# Patient Record
Sex: FEMALE | Race: WHITE | Employment: FULL TIME | URBAN - METROPOLITAN AREA
[De-identification: names, ages, dates, MRNs, and addresses within clinical notes are randomized per-mention and may not be internally consistent; named-entity substitution may affect disease eponyms.]

---

## 2020-03-09 ENCOUNTER — OFFICE VISIT (OUTPATIENT)
Dept: FAMILY MEDICINE CLINIC | Age: 60
End: 2020-03-09
Payer: COMMERCIAL

## 2020-03-09 VITALS
WEIGHT: 166.2 LBS | RESPIRATION RATE: 16 BRPM | HEIGHT: 70 IN | BODY MASS INDEX: 23.79 KG/M2 | DIASTOLIC BLOOD PRESSURE: 78 MMHG | OXYGEN SATURATION: 98 % | SYSTOLIC BLOOD PRESSURE: 120 MMHG | HEART RATE: 77 BPM

## 2020-03-09 PROCEDURE — 99203 OFFICE O/P NEW LOW 30 MIN: CPT | Performed by: FAMILY MEDICINE

## 2020-03-09 PROCEDURE — 99386 PREV VISIT NEW AGE 40-64: CPT | Performed by: FAMILY MEDICINE

## 2020-03-09 ASSESSMENT — PATIENT HEALTH QUESTIONNAIRE - PHQ9
1. LITTLE INTEREST OR PLEASURE IN DOING THINGS: 0
2. FEELING DOWN, DEPRESSED OR HOPELESS: 0
SUM OF ALL RESPONSES TO PHQ QUESTIONS 1-9: 0
SUM OF ALL RESPONSES TO PHQ9 QUESTIONS 1 & 2: 0
SUM OF ALL RESPONSES TO PHQ QUESTIONS 1-9: 0

## 2020-03-09 ASSESSMENT — ENCOUNTER SYMPTOMS
ABDOMINAL PAIN: 0
VOMITING: 0
SHORTNESS OF BREATH: 0
DIARRHEA: 0
NAUSEA: 0
CONSTIPATION: 0

## 2020-03-09 NOTE — PROGRESS NOTES
None   Lifestyle    Physical activity     Days per week: None     Minutes per session: None    Stress: None   Relationships    Social connections     Talks on phone: None     Gets together: None     Attends [employer] service: None     Active member of club or organization: None     Attends meetings of clubs or organizations: None     Relationship status: None    Intimate partner violence     Fear of current or ex partner: None     Emotionally abused: None     Physically abused: None     Forced sexual activity: None   Other Topics Concern    None   Social History Narrative    None     Family History   Problem Relation Age of Onset    High Blood Pressure Mother     Cancer Father         leukemia at 39    Cancer Brother         bladder cancer at 62                              Review Of Systems    Review of Systems   Constitutional: Negative for chills and fever. Eyes: Negative for visual disturbance. Respiratory: Negative for shortness of breath. Cardiovascular: Negative for chest pain. Gastrointestinal: Negative for abdominal pain, constipation, diarrhea, nausea and vomiting. Genitourinary: Negative for dysuria. Psychiatric/Behavioral: Negative for behavioral problems. PHYSICAL EXAMINATION:    /78   Pulse 77   Resp 16   Ht 5' 10\" (1.778 m)   Wt 166 lb 3.2 oz (75.4 kg)   SpO2 98%   BMI 23.85 kg/m²      Physical Exam  Vitals signs reviewed. Constitutional:       General: She is not in acute distress. Appearance: She is well-developed. She is not diaphoretic. HENT:      Head: Normocephalic and atraumatic. Right Ear: External ear normal.      Left Ear: External ear normal.      Mouth/Throat:      Pharynx: No oropharyngeal exudate. Eyes:      General:         Right eye: No discharge. Left eye: No discharge. Conjunctiva/sclera: Conjunctivae normal.      Pupils: Pupils are equal, round, and reactive to light.    Cardiovascular:      Rate and Rhythm: Normal rate and regular rhythm. Heart sounds: Normal heart sounds. Pulmonary:      Effort: Pulmonary effort is normal. No respiratory distress. Breath sounds: Normal breath sounds. No wheezing. Abdominal:      General: Bowel sounds are normal. There is no distension. Palpations: Abdomen is soft. Tenderness: There is no abdominal tenderness. Comments: Lipoma mid abdomen. Skin:     General: Skin is warm and dry. Neurological:      Mental Status: She is alert and oriented to person, place, and time. ASSESSMENT:   Well Adult, See encounter diagnoses  Adilene Mahajan was seen today for new patient. Diagnoses and all orders for this visit:    Annual physical exam  -     Lipid Panel  -     Comprehensive Metabolic Panel    History of uterine cancer  -     Silvana - Corinne Dixon DO, Obstetrics, [facility]    Breast cancer screening  -     ANIL SCREENING W CAD BILATERAL 2 VW; Future    Lipoma of torso  -     Isis Orozco MD, General Surgery, [facility]    Need for hepatitis C screening test  -     Hepatitis C Antibody    Immunity status testing  -     RUBEOLA ANTIBODY IGG          Plan:   See orders and medications filed with this encounter. The patient is advised to return for routine annual checkups. Encouraged healthy diet, regular exercise and multivitamin daily. Labs checked per orders. Optho visit q 1-2 years. Watch for skin mole changes. Colonoscopy if over age 48 or if family history was discussed. Vaccines ordered today per orders.     Return in about 1 year (around 3/9/2021) for physical exam.

## 2020-03-10 ENCOUNTER — TELEPHONE (OUTPATIENT)
Dept: FAMILY MEDICINE CLINIC | Age: 60
End: 2020-03-10

## 2020-03-10 LAB
A/G RATIO: 1.5 (ref 1.1–2.2)
ALBUMIN SERPL-MCNC: 4.5 G/DL (ref 3.4–5)
ALP BLD-CCNC: 68 U/L (ref 40–129)
ALT SERPL-CCNC: 22 U/L (ref 10–40)
ANION GAP SERPL CALCULATED.3IONS-SCNC: 14 MMOL/L (ref 3–16)
AST SERPL-CCNC: 21 U/L (ref 15–37)
BILIRUB SERPL-MCNC: 0.3 MG/DL (ref 0–1)
BUN BLDV-MCNC: 17 MG/DL (ref 7–20)
CALCIUM SERPL-MCNC: 9.5 MG/DL (ref 8.3–10.6)
CHLORIDE BLD-SCNC: 100 MMOL/L (ref 99–110)
CHOLESTEROL, TOTAL: 224 MG/DL (ref 0–199)
CO2: 26 MMOL/L (ref 21–32)
CREAT SERPL-MCNC: 0.7 MG/DL (ref 0.6–1.1)
GFR AFRICAN AMERICAN: >60
GFR NON-AFRICAN AMERICAN: >60
GLOBULIN: 3 G/DL
GLUCOSE BLD-MCNC: 93 MG/DL (ref 70–99)
HDLC SERPL-MCNC: 69 MG/DL (ref 40–60)
HEPATITIS C ANTIBODY INTERPRETATION: NORMAL
LDL CHOLESTEROL CALCULATED: 135 MG/DL
MEASLES IMMUNE (IGG): NORMAL
POTASSIUM SERPL-SCNC: 4.5 MMOL/L (ref 3.5–5.1)
REASON FOR REJECTION: NORMAL
REJECTED TEST: NORMAL
SODIUM BLD-SCNC: 140 MMOL/L (ref 136–145)
TOTAL PROTEIN: 7.5 G/DL (ref 6.4–8.2)
TRIGL SERPL-MCNC: 98 MG/DL (ref 0–150)
VLDLC SERPL CALC-MCNC: 20 MG/DL

## 2020-05-14 ENCOUNTER — TELEPHONE (OUTPATIENT)
Dept: SURGERY | Age: 60
End: 2020-05-14

## 2020-05-19 ENCOUNTER — OFFICE VISIT (OUTPATIENT)
Dept: OBGYN CLINIC | Age: 60
End: 2020-05-19
Payer: COMMERCIAL

## 2020-05-19 VITALS
TEMPERATURE: 98.1 F | BODY MASS INDEX: 23.88 KG/M2 | HEART RATE: 97 BPM | DIASTOLIC BLOOD PRESSURE: 74 MMHG | WEIGHT: 166.8 LBS | HEIGHT: 70 IN | SYSTOLIC BLOOD PRESSURE: 118 MMHG

## 2020-05-19 PROBLEM — Z85.42 HISTORY OF UTERINE CANCER: Status: ACTIVE | Noted: 2020-05-19

## 2020-05-19 PROBLEM — N39.3 SUI (STRESS URINARY INCONTINENCE, FEMALE): Status: ACTIVE | Noted: 2020-05-19

## 2020-05-19 PROBLEM — R61 NIGHT SWEATS: Status: ACTIVE | Noted: 2020-05-19

## 2020-05-19 PROCEDURE — 99386 PREV VISIT NEW AGE 40-64: CPT | Performed by: OBSTETRICS & GYNECOLOGY

## 2020-05-19 RX ORDER — PAROXETINE HYDROCHLORIDE 12.5 MG/1
12.5 TABLET, FILM COATED, EXTENDED RELEASE ORAL DAILY
Qty: 30 TABLET | Refills: 3 | Status: SHIPPED | OUTPATIENT
Start: 2020-05-19 | End: 2020-05-20

## 2020-05-19 ASSESSMENT — ENCOUNTER SYMPTOMS
VOMITING: 0
SHORTNESS OF BREATH: 0
ABDOMINAL PAIN: 0
COUGH: 0
DIARRHEA: 0
NAUSEA: 0
CONSTIPATION: 0
SORE THROAT: 0

## 2020-05-19 NOTE — PROGRESS NOTES
Positive for sleep disturbance (difficulty staying asleep). Primary Care Physician: Onel Silva MD    Obstetric History  OB History    Para Term  AB Living   3 3           SAB TAB Ectopic Molar Multiple Live Births                    # Outcome Date GA Lbr Wojciech/2nd Weight Sex Delivery Anes PTL Lv   3 Para 89   9 lb (4.082 kg) M Vag-Spont      2 Para 87   10 lb (4.536 kg) M Vag-Spont      1 Para 85   9 lb (4.082 kg) M Vag-Spont          Gynecologic History  Menstrual History:   LMP: s/p Hysterectomy in    Age of Menarche: 17   Hx of Stage 1 endometrial cancer    Sexual History:   Contraception: see above   Currently is sexually active   1 Lifetime partners   Denies history of STIs   Denies sexual problems    Pap History:   History of abnormal pap smears: see above   Last pap: see above      Medical History:  Past Medical History:   Diagnosis Date    Menopausal symptoms     Uterine cancer ([facility] [address] 2011    at 50 stage 1       Medications:  Current Outpatient Medications   Medication Sig Dispense Refill    PARoxetine (PAXIL CR) 12.5 MG extended release tablet Take 1 tablet by mouth daily 30 tablet 3     No current facility-administered medications for this visit. Surgical History:  Past Surgical History:   Procedure Laterality Date    COLONOSCOPY      COLONOSCOPY  3/9/16    poor prep;no polyps    HYSTERECTOMY  2011    HYSTERECTOMY, TOTAL ABDOMINAL      LIPOMA RESECTION      abd.    TUBAL LIGATION         Allergies:  No Known Allergies    Family History:  Family History   Problem Relation Age of Onset    High Blood Pressure Mother     Cancer Father         leukemia at 43    Cancer Brother         bladder cancer at 62      Reports personal/family history of cervical, uterine, ovarian, vulvar, breast, or colon cancers.      - Personal hx of uterine cancer  Denies personal/family history of bleeding or clotting disorders  Denies personal/family of motion and neck supple. Thyroid: No thyromegaly. Cardiovascular:      Rate and Rhythm: Normal rate. Pulses: Normal pulses. Pulmonary:      Effort: Pulmonary effort is normal. No respiratory distress. Chest:      Breasts:         Right: No mass, nipple discharge, skin change or tenderness. Left: No mass, nipple discharge, skin change or tenderness. Abdominal:      General: There is no distension. Palpations: Abdomen is soft. There is no mass. Tenderness: There is no abdominal tenderness. There is no guarding or rebound. Genitourinary:     General: Normal vulva. Exam position: Lithotomy position. Labia:         Right: No rash, tenderness or lesion. Left: No rash, tenderness or lesion. Vagina: No vaginal discharge, erythema or tenderness. Adnexa:         Right: No mass, tenderness or fullness. Left: No mass, tenderness or fullness. Comments: Uterus, cervix and bilateral ovaries surgically absent. No masses or lesions on vaginal cuff. Musculoskeletal:         General: No swelling. Skin:     General: Skin is warm and dry. Neurological:      Mental Status: She is alert and oriented to person, place, and time. Psychiatric:         Mood and Affect: Mood normal.         Behavior: Behavior normal.         Thought Content: Thought content normal.       Assessment/Plan:  64 y.o.  presenting for her annual exam:    1. Encntr for gyn exam (general) (routine) w/o abn findings     - Reviewed indications for pap smear following negative cervical pathology on hysterectomy specimen - no pap smear collected today     - Age based screening recommendations discussed     - Self breast exams/awareness discussed with the patient     - Healthy lifestyle habits discussed including calcium and vitamin D supplementation     - Will follow-up in 1 year for annual exam     2.  Night sweats     - Risks, benefits and alternatives were reviewed with the

## 2020-05-19 NOTE — PROGRESS NOTES
Last PAP was normal; 1 or 2 years ago   Abnormal pap history? no  Last HPV screen: unknown    {MAMMOGRAM HISTORY patient had mammogram today  Last Dexa Scan: N/A   Contraceptive method: Status post hysterectomy  Last colonoscopy was 2016

## 2020-05-20 ENCOUNTER — TELEPHONE (OUTPATIENT)
Dept: OBGYN CLINIC | Age: 60
End: 2020-05-20

## 2020-05-20 RX ORDER — PAROXETINE 10 MG/1
10 TABLET, FILM COATED ORAL EVERY MORNING
Qty: 30 TABLET | Refills: 3 | Status: SHIPPED | OUTPATIENT
Start: 2020-05-20 | End: 2021-04-01

## 2020-05-20 NOTE — TELEPHONE ENCOUNTER
Received fax from [facility], Paroxetine 12.5 mg not covered under patient plan. Spoke to pharmacist, states due to increased cost of the 12.5 mg dose her insurance does not cover. States they do cover the 10 or 40 mg dose. Do you want to change Rx?  Prompted order, please review and send if appropriate

## 2020-06-04 NOTE — TELEPHONE ENCOUNTER
Pt calling to check status of Paxil medication order as she was told by pharmacy it would not be covered. Verified with pharmacy that medication with dose change was approved and ready for . LM for pt to let her medication is now available for .   DONE

## 2020-06-30 ENCOUNTER — HOSPITAL ENCOUNTER (OUTPATIENT)
Dept: PHYSICAL THERAPY | Age: 60
Setting detail: THERAPIES SERIES
Discharge: HOME | End: 2020-06-30
Payer: COMMERCIAL

## 2020-06-30 PROCEDURE — 97530 THERAPEUTIC ACTIVITIES: CPT

## 2020-06-30 PROCEDURE — 97163 PT EVAL HIGH COMPLEX 45 MIN: CPT

## 2020-06-30 NOTE — PLAN OF CARE
Physical Therapy Pelvic Floor Evaluation    2020  Patient: Agustina Aiken : 1960  MRN: [id number]    This is a 64 y.o. female [facility]quez[facility] with a primary diagnosis of: KOURTNEY    Onset Date:   Next MD appt: prn    Subjective:   Patient history: Patient reports \"started noticing symptoms of UI in my 40's\". Reports \"I first noticed it when jumping around, sneezing\". \"I think it is better when I exercise\". Reports \"I notice things are worse when I don't exercise\". Denies pain with intercourse, denies bowel symptoms. Reports she had a complete hysterectomy at age 47. Denies history of sexual abuse/trauma. 2nd person present during evaluation today? Declined  What do you feel is your problem? \"incontinence\"   When did you problem first start? \"in my 40's is when I really noticed\". Has your problem been getting worse, stay the same, or getting better? \"getting a little worse\"  Have you ever had treatment for this problem? NO    4 week or greater of failed trial of PFPT program? NO   PFPT program as defined by \"Completing 4 weeks of an ordered plan of pelvic muscle exercises designed to increase periurethral muscle strength\". Urinary frequency? Daytime? YES Nighttime? YES  Bowel problems? denies  Urinary or bowel leakage? urinary  Leakage frequency? 3-4x/day  Protection: none      Pregnancy:   # of pregnancies: 3   # of deliveries: 3 vaginal   Episiotomy: yes   Normal post-cris healing? yes  Are you having regular menstrual cycles? No, hysterectomy at age 47  Date of last pap smear? May 2020    Pain: 0/10    Sensation/neurovascular: denies N/T  Diagnostic tests: none done to date.      Precautions/Contraindications: universal       Past Medical History:   Diagnosis Date    Menopausal symptoms     Uterine cancer ([facility] [address] 2011    at 50 stage 1     Past Surgical History:   Procedure Laterality Date    COLONOSCOPY      COLONOSCOPY  3/9/16    poor prep;no polyps    HYSTERECTOMY  9/2011    HYSTERECTOMY, TOTAL ABDOMINAL      LIPOMA RESECTION      abd.    TUBAL LIGATION       Not in a hospital admission. Current Outpatient Medications:     PARoxetine (PAXIL) 10 MG tablet, Take 1 tablet by mouth every morning, Disp: 30 tablet, Rfl: 3  No Known Allergies      Objective:    PFDI score:102.1/300    Observation:   Posture: WFL   Gait analysis: WFL  Lumbar Mobility: WFL  Scar Location/Mobility: n/a  Diastasis Recti (in finger width): n/a    Special Tests:  Sacroiliac Test:   Gaenslen: negative    ELVIS: positive     Lumbar Spine:   Slump test: negative       Neuro:   Sensation: (B) UEs: intact to light touch   Sensation: (B) LEs: intact to light touch   Anal Sensation:    S5 intact to light touch    S4 intact to light touch    S3 intact to light touch   Reflexes:     Anal: present    Cough: present    Pelvic Floor Exam  External:  Skin Condition: normal  Sensation: intact  Palpation: no point tenderness noted  Tone: hypotonic  Perineal Body Mobility:    Voluntary PFM Contraction: present (normal)   Voluntary PFM Relaxation: present (normal)   Involuntary PFM Contraction/Cough Reflex: present (normal)   Involuntary PFM Relaxation: present (normal)  Perineal Body Descent:   Rest: flattened   Bearing down: absent (normal-cephalad)    Q-tip test: negative       Internal:  Sensation: intact  Palpation: no point tenderness noted   Vaginal Vault Size: WNL  PERFECT:   Power: 3+/5   Endurance: 3sec. Repetitions: 10   Fast Twitch: 10   Elevation: present   Co-contraction: present   Timing: present  OI strength: 4/5 B  Pelvic Organ Prolapse: none noted       Treatment: see flowsheet    Assessment:  Impression: Patient is a 63yo female referred to PT due to urinary incontinence. The patient demonstrated decreased pelvic floor strength and endurance. The patient is reporting stress incontinence, frequent urination, and some bowel discomfort.   The patient seems very motivated and should improve with PT and HEP compliance. Time Frame: 4-6 weeks. Rehab Potential: good    Goals:  1. Patient will demonstrate independence with HEP to self-manage symptoms. 2. Patient will demonstrate pelvic floor strength of at least 4/5 to improve functioning. 3. Patient will demonstrate pelvic floor endurance of at least 8 seconds indicating improved endurance. 4. Patient will improve PFDI score by at least 25 points demonstrating improved pelvic floor functioning and quality of life. Plan  Patient will be seen 1-2x/week for 4-6weeks. Rx to consist of: Home management, NMred, manual, modalities PRN, TA, TE    Time IN/OUT: 11:15am-12:11pm      Letty Taylor PT, DPT    ______________________________________________________________________    If you have any questions or concerns, please don't hesitate to call.   Thank you for your referral.    Physician Signature:________________________________Date:__________________  By signing above, therapists plan is approved by physician

## 2020-06-30 NOTE — FLOWSHEET NOTE
Physical Therapy Daily Treatment Note    Date:  2020    Patient Name:  Mikc Montero    :  1960  MRN: [id number]  Restrictions/Precautions:  Universal   Medical/Treatment Diagnosis Information:  · Diagnosis: KOURTNEY  Insurance/Certification information:  PT Insurance Information: [employer]  Physician Information:  Referring Practitioner: Corrinne Gerlach DO  Plan of care signed (Y/N):  N  Visit# / total visits:       G-Code (if applicable):          PFDI: 102.1/300    Medicare Cap (if applicable):  n/a = total amount used, updated 2020    Time in:   11:15am      Timed Treatment: 15min. Total Treatment Time:  56min.  ________________________________________________________________________________________    Pain Level:    /10  SUBJECTIVE:  See eval    OBJECTIVE:     Exercise (TE) Resistance/Repetitions Other comments            PF strengthening                                PF relaxation                                   Other Treatment:   TA:  Bladder re-training/education: 15min    Bladder Diary: patient educated on importance of filling out bladder diary at home, complete with fluid intake, voids, and leakage when applicable. Voiding: patient educated on normal voiding and urinary cycle and the physiology of bladder control muscles and pelvic floor. The patient was educated on bladder dysfunction as well as KOURTNEY with urethral involvement. The patient was also educated on prolapse and it's affect on urination and pain. Dietary: patient educated on the \"4Cs\" to reduce bladder irritation and leakage. Other:    Manual Treatments:   --      Modalities:  --    Test/Measurements:  See eval           ASSESSMENT:  See eval       Treatment/Activity Tolerance:   [x] Patient tolerated treatment well [] Patient limited by fatique  [] Patient limited by pain [] Patient limited by other medical complications  [] Other:     Goals:    Time Frame: 4-6 weeks. Rehab Potential: good     Goals:  1. Patient will demonstrate independence with HEP to self-manage symptoms. 2. Patient will demonstrate pelvic floor strength of at least 4/5 to improve functioning. 3. Patient will demonstrate pelvic floor endurance of at least 8 seconds indicating improved endurance.   4. Patient will improve PFDI score by at least 25 points demonstrating improved pelvic floor functioning and quality of life        Plan: [x] Continue per plan of care [] Alter current plan (see comments)   [] Plan of care initiated [] Hold pending MD visit [] Discharge      Plan for Next Session:  Review diary    Re-Certification Due Date: July 30,2020        Signature:  Monika Win, PT, DPT

## 2020-07-02 ENCOUNTER — APPOINTMENT (OUTPATIENT)
Dept: PHYSICAL THERAPY | Age: 60
End: 2020-07-02
Payer: COMMERCIAL

## 2020-07-07 ENCOUNTER — HOSPITAL ENCOUNTER (OUTPATIENT)
Dept: PHYSICAL THERAPY | Age: 60
Setting detail: THERAPIES SERIES
Discharge: HOME | End: 2020-07-07
Payer: COMMERCIAL

## 2020-07-07 PROCEDURE — 97530 THERAPEUTIC ACTIVITIES: CPT

## 2020-07-07 PROCEDURE — 97110 THERAPEUTIC EXERCISES: CPT

## 2020-07-07 NOTE — FLOWSHEET NOTE
Physical Therapy Daily Treatment Note    Date:  2020    Patient Name:  Aida Lorenzo    :  1960  MRN: [id number]  Restrictions/Precautions:  Universal   Medical/Treatment Diagnosis Information:  · Diagnosis: KOURTNEY  Insurance/Certification information:  PT Insurance Information: [employer]  Physician Information:  Referring Practitioner: Nereyda Bruce DO  Plan of care signed (Y/N):  Y  Visit# / total visits:       G-Code (if applicable):          PFDI: 102.1/300    Medicare Cap (if applicable):  n/a = total amount used, updated 2020    Time in:   12:30pm      Timed Treatment: 42min. Total Treatment Time:  42min.  ________________________________________________________________________________________    Pain Level:    0/10  SUBJECTIVE:  Patient reports she is doing well, no new complaints. OBJECTIVE:     Exercise (TE) Resistance/Repetitions Other comments            PF strengthening        Short hold: (1sec) 10 times       Long hold: (3-5sec) 10 times     PF squat   x10   Hook-lying TA [id number]/ BP cuff nv   Bridge x10   Seated PF + ADD squeeze nv           PF relaxation                                   Other Treatment:   TA:  Bladder re-training/education: 25min    Bladder Diary:patient voiding 9-10x/day, 3-4 small leaks per day (associated with movement/stress)    Dietary: patient educated on the \"4Cs\" to reduce bladder irritation and leakage, review of bladder diet. Other:    Manual Treatments:   --      Modalities:  --    Test/Measurements:  See eval           ASSESSMENT:  The patient performed above TE well with no increase in pain. The patient adapted nicely to added TE today. Responded well to education provided as well. Treatment/Activity Tolerance:   [x] Patient tolerated treatment well [] Patient limited by fatique  [] Patient limited by pain [] Patient limited by other medical complications  [] Other:     Goals:    Time Frame: 4-6 weeks.  Rehab Potential: good     Goals:  1. Patient will demonstrate independence with HEP to self-manage symptoms. 2. Patient will demonstrate pelvic floor strength of at least 4/5 to improve functioning. 3. Patient will demonstrate pelvic floor endurance of at least 8 seconds indicating improved endurance. 4. Patient will improve PFDI score by at least 25 points demonstrating improved pelvic floor functioning and quality of life        Plan: [x] Continue per plan of care [] Alter current plan (see comments)   [] Plan of care initiated [] Hold pending MD visit [] Discharge      Plan for Next Session:  JUVENTINO nelson.     Re-Certification Due Date: July 30,2020        Signature:  Julio Lyman, PT, DPT

## 2020-07-09 ENCOUNTER — HOSPITAL ENCOUNTER (OUTPATIENT)
Dept: PHYSICAL THERAPY | Age: 60
Setting detail: THERAPIES SERIES
Discharge: HOME | End: 2020-07-09
Payer: COMMERCIAL

## 2020-07-09 PROCEDURE — 97110 THERAPEUTIC EXERCISES: CPT

## 2020-07-09 NOTE — FLOWSHEET NOTE
Physical Therapy Daily Treatment Note    Date:  2020    Patient Name:  Adalberto Fox    :  1960  MRN: [id number]  Restrictions/Precautions:  Universal   Medical/Treatment Diagnosis Information:  · Diagnosis: KOURTNEY  Insurance/Certification information:  PT Insurance Information: [employer]  Physician Information:  Referring Practitioner: Kenyatta Iraheta DO  Plan of care signed (Y/N):  Y  Visit# / total visits:  3/12     G-Code (if applicable):          PFDI: 102.1/300    Medicare Cap (if applicable):  n/a = total amount used, updated 2020    Time in:   8:15am      Timed Treatment: 40min. Total Treatment Time:  40min.  ________________________________________________________________________________________    Pain Level:    0/10  SUBJECTIVE:  Patient \"things are going good, I did the exercises\". Reports compliance with HEP. OBJECTIVE:     Exercise (TE) Resistance/Repetitions Other comments           PF strengthening        Short hold: (1sec) 10 times       Long hold: (3-5sec) 10 times     PF squat   x10   Hook-lying TA [id number]  March x10BW/ BP cuff   Bridge x10   Seated PF + ADD squeeze x10 3sec. Seated PF + ABD squeeze x10 3sec. Clamshell            PF relaxation                TG w/ PF contraction     x4min              Other Treatment:   TA:      Manual Treatments:   --      Modalities:  --    Test/Measurements:  See eval           ASSESSMENT:  The patient performed above TE well with no increase in pain. The patient adapted nicely to added TE today. Today's session focused on strengthening of pelvic floor and core. Responded well to education provided as well. Treatment/Activity Tolerance:   [x] Patient tolerated treatment well [] Patient limited by fatique  [] Patient limited by pain [] Patient limited by other medical complications  [] Other:     Goals:    Time Frame: 4-6 weeks. Rehab Potential: good     Goals:  1.  Patient will demonstrate independence with HEP to self-manage symptoms. 2. Patient will demonstrate pelvic floor strength of at least 4/5 to improve functioning. 3. Patient will demonstrate pelvic floor endurance of at least 8 seconds indicating improved endurance. 4. Patient will improve PFDI score by at least 25 points demonstrating improved pelvic floor functioning and quality of life        Plan: [x] Continue per plan of care [] Alter current plan (see comments)   [] Plan of care initiated [] Hold pending MD visit [] Discharge      Plan for Next Session:  JUVENTINO nelson.     Re-Certification Due Date: July 30,2020        Signature:  Sara Hendricks PT, DPT

## 2020-07-14 ENCOUNTER — APPOINTMENT (OUTPATIENT)
Dept: PHYSICAL THERAPY | Age: 60
End: 2020-07-14
Payer: COMMERCIAL

## 2020-07-16 ENCOUNTER — HOSPITAL ENCOUNTER (OUTPATIENT)
Dept: PHYSICAL THERAPY | Age: 60
Setting detail: THERAPIES SERIES
Discharge: HOME | End: 2020-07-16
Payer: COMMERCIAL

## 2020-07-16 PROCEDURE — 97110 THERAPEUTIC EXERCISES: CPT

## 2020-07-16 PROCEDURE — 97530 THERAPEUTIC ACTIVITIES: CPT

## 2020-07-16 NOTE — FLOWSHEET NOTE
making good improvement already and should continue to improve. Will trial every other week for treatments. Treatment/Activity Tolerance:   [x] Patient tolerated treatment well [] Patient limited by fatique  [] Patient limited by pain [] Patient limited by other medical complications  [] Other:     Goals:    Time Frame: 4-6 weeks. Rehab Potential: good     Goals:  1. Patient will demonstrate independence with HEP to self-manage symptoms. 2. Patient will demonstrate pelvic floor strength of at least 4/5 to improve functioning. 3. Patient will demonstrate pelvic floor endurance of at least 8 seconds indicating improved endurance. 4. Patient will improve PFDI score by at least 25 points demonstrating improved pelvic floor functioning and quality of life        Plan: [x] Continue per plan of care [] Alter current plan (see comments)   [] Plan of care initiated [] Hold pending MD visit [] Discharge      Plan for Next Session:  JUVENTINO nelson.     Re-Certification Due Date: July 30,2020        Signature:  Hi De Paz, PT, DPT

## 2020-07-28 ENCOUNTER — APPOINTMENT (OUTPATIENT)
Dept: PHYSICAL THERAPY | Age: 60
End: 2020-07-28
Payer: COMMERCIAL

## 2020-08-04 ENCOUNTER — APPOINTMENT (OUTPATIENT)
Dept: PHYSICAL THERAPY | Age: 60
End: 2020-08-04
Payer: COMMERCIAL

## 2021-04-01 ENCOUNTER — OFFICE VISIT (OUTPATIENT)
Dept: FAMILY MEDICINE CLINIC | Age: 61
End: 2021-04-01
Payer: COMMERCIAL

## 2021-04-01 VITALS
DIASTOLIC BLOOD PRESSURE: 80 MMHG | HEART RATE: 69 BPM | SYSTOLIC BLOOD PRESSURE: 112 MMHG | BODY MASS INDEX: 22.9 KG/M2 | TEMPERATURE: 96.9 F | WEIGHT: 160 LBS | RESPIRATION RATE: 16 BRPM | OXYGEN SATURATION: 98 % | HEIGHT: 70 IN

## 2021-04-01 DIAGNOSIS — Z12.31 ENCOUNTER FOR SCREENING MAMMOGRAM FOR MALIGNANT NEOPLASM OF BREAST: ICD-10-CM

## 2021-04-01 DIAGNOSIS — G47.00 INSOMNIA, UNSPECIFIED TYPE: ICD-10-CM

## 2021-04-01 DIAGNOSIS — D17.1 LIPOMA OF TORSO: ICD-10-CM

## 2021-04-01 DIAGNOSIS — Z00.00 HEALTHCARE MAINTENANCE: ICD-10-CM

## 2021-04-01 DIAGNOSIS — Z12.11 COLON CANCER SCREENING: ICD-10-CM

## 2021-04-01 DIAGNOSIS — Z76.89 ENCOUNTER TO ESTABLISH CARE: Primary | ICD-10-CM

## 2021-04-01 PROCEDURE — 99214 OFFICE O/P EST MOD 30 MIN: CPT | Performed by: NURSE PRACTITIONER

## 2021-04-01 RX ORDER — TRAZODONE HYDROCHLORIDE 50 MG/1
50 TABLET ORAL NIGHTLY PRN
Qty: 30 TABLET | Refills: 0 | Status: SHIPPED | OUTPATIENT
Start: 2021-04-01 | End: 2021-10-19 | Stop reason: SDUPTHER

## 2021-04-01 ASSESSMENT — PATIENT HEALTH QUESTIONNAIRE - PHQ9
SUM OF ALL RESPONSES TO PHQ QUESTIONS 1-9: 0
2. FEELING DOWN, DEPRESSED OR HOPELESS: 0

## 2021-04-01 ASSESSMENT — ENCOUNTER SYMPTOMS
EYES NEGATIVE: 1
RESPIRATORY NEGATIVE: 1
GASTROINTESTINAL NEGATIVE: 1

## 2021-04-01 NOTE — PATIENT INSTRUCTIONS
Patient Education        Well Visit, Women 48 to 72: Care Instructions  Overview     Well visits can help you stay healthy. Your doctor has checked your overall health and may have suggested ways to take good care of yourself. Your doctor also may have recommended tests. At home, you can help prevent illness with healthy eating, regular exercise, and other steps. Follow-up care is a key part of your treatment and safety. Be sure to make and go to all appointments, and call your doctor if you are having problems. It's also a good idea to know your test results and keep a list of the medicines you take. How can you care for yourself at home? · Get screening tests that you and your doctor decide on. Screening helps find diseases before any symptoms appear. · Eat healthy foods. Choose fruits, vegetables, whole grains, protein, and low-fat dairy foods. Limit fat, especially saturated fat. Reduce salt in your diet. · Limit alcohol. Have no more than 1 drink a day or 7 drinks a week. · Get at least 30 minutes of exercise on most days of the week. Walking is a good choice. You also may want to do other activities, such as running, swimming, cycling, or playing tennis or team sports. · Reach and stay at a healthy weight. This will lower your risk for many problems, such as obesity, diabetes, heart disease, and high blood pressure. · Do not smoke. Smoking can make health problems worse. If you need help quitting, talk to your doctor about stop-smoking programs and medicines. These can increase your chances of quitting for good. · Care for your mental health. It is easy to get weighed down by worry and stress. Learn strategies to manage stress, like deep breathing and mindfulness, and stay connected with your family and community. If you find you often feel sad or hopeless, talk with your doctor. Treatment can help.   · Talk to your doctor about whether you have any risk factors for sexually transmitted infections (STIs). You can help prevent STIs if you wait to have sex with a new partner (or partners) until you've each been tested for STIs. It also helps if you use condoms (male or female condoms) and if you limit your sex partners to one person who only has sex with you. Vaccines are available for some STIs. · If you think you may have a problem with alcohol or drug use, talk to your doctor. This includes prescription medicines (such as amphetamines and opioids) and illegal drugs (such as cocaine and methamphetamine). Your doctor can help you figure out what type of treatment is best for you. · Protect your skin from too much sun. When you're outdoors from 10 a.m. to 4 p.m., stay in the shade or cover up with clothing and a hat with a wide brim. Wear sunglasses that block UV rays. Even when it's cloudy, put broad-spectrum sunscreen (SPF 30 or higher) on any exposed skin. · See a dentist one or two times a year for checkups and to have your teeth cleaned. · Wear a seat belt in the car. When should you call for help? Watch closely for changes in your health, and be sure to contact your doctor if you have any problems or symptoms that concern you. Where can you learn more? Go to https://Bluetector.healthSteek SA. org and sign in to your Odoo ([employer] Open[employer]) account. Enter U176 in the K[employer]or box to learn more about \"Well Visit, Women 50 to 72: Care Instructions. \"     If you do not have an account, please click on the \"Sign Up Now\" link. Current as of: May 27, 2020               Content Version: 12.8  © 2006-2021 Healthwise, Incorporated. Care instructions adapted under license by [facility] ([facility]). If you have questions about a medical condition or this instruction, always ask your healthcare professional. [place] [address] any warranty or liability for your use of this information.          Patient Education        Insomnia: Care Instructions  Your Care Instructions     Insomnia is the inability to sleep well. It is a common problem for most people at some time. Insomnia may make it hard for you to get to sleep, stay asleep, or sleep as long as you need to. This can make you tired and grouchy during the day. It can also make you forgetful, less effective at work, and unhappy. Insomnia can be caused by conditions such as depression or anxiety. Pain can also affect your ability to sleep. When these problems are solved, the insomnia usually clears up. But sometimes bad sleep habits can cause insomnia. If insomnia is affecting your work or your enjoyment of life, you can take steps to improve your sleep. Follow-up care is a key part of your treatment and safety. Be sure to make and go to all appointments, and call your doctor if you are having problems. It's also a good idea to know your test results and keep a list of the medicines you take. How can you care for yourself at home? What to avoid   · Do not have drinks with caffeine, such as coffee or black tea, for 8 hours before bed. · Do not smoke or use other types of tobacco near bedtime. Nicotine is a stimulant and can keep you awake. · Avoid drinking alcohol late in the evening, because it can cause you to wake in the middle of the night. · Do not eat a big meal close to bedtime. If you are hungry, eat a light snack. · Do not drink a lot of water close to bedtime, because the need to urinate may wake you up during the night. · Do not read or watch TV in bed. Use the bed only for sleeping and sexual activity. What to try   · Go to bed at the same time every night, and wake up at the same time every morning. Do not take naps during the day. · Keep your bedroom quiet, dark, and cool. · Sleep on a comfortable pillow and mattress. · If watching the clock makes you anxious, turn it facing away from you so you cannot see the time. · If you worry when you lie down, start a worry book.  Well before bedtime, write down your worries, and then set the floor.  3. With your other hand, gently bend your wrist farther until you feel a mild to moderate stretch in your forearm. 4. Hold for at least 15 to 30 seconds. Repeat 2 to 4 times. Wrist extensor stretch   1. Repeat steps 1 to 4 of the stretch above but begin with your extended hand palm down. Ball or sock squeeze   1. Hold a tennis ball (or a rolled-up sock) in your hand. 2. Make a fist around the ball (or sock) and squeeze. 3. Hold for about 6 seconds, and then relax for up to 10 seconds. 4. Repeat 8 to 12 times. 5. Switch the ball (or sock) to your other hand and do 8 to 12 times. Wrist deviation   1. Sit so that your arm is supported but your hand hangs off the edge of a flat surface, such as a table. 2. Hold your hand out like you are shaking hands with someone. 3. Move your hand up and down. 4. Repeat this motion 8 to 12 times. 5. Switch arms. 6. Try to do this exercise twice with each hand. Wrist curls   1. Place your forearm on a table with your hand hanging over the edge of the table, palm up. 2. Place a 1- to 2-pound weight in your hand. This may be a dumbbell, a can of food, or a filled water bottle. 3. Slowly raise and lower the weight while keeping your forearm on the table and your palm facing up. 4. Repeat this motion 8 to 12 times. 5. Switch arms, and do steps 1 through 4.  6. Repeat with your hand facing down toward the floor. Switch arms. Biceps curls   1. Sit leaning forward with your legs slightly spread and your left hand on your left thigh. 2. Place your right elbow on your right thigh, and hold the weight with your forearm horizontal.  3. Slowly curl the weight up and toward your chest.  4. Repeat this motion 8 to 12 times. 5. Switch arms, and do steps 1 through 4. Follow-up care is a key part of your treatment and safety. Be sure to make and go to all appointments, and call your doctor if you are having problems.  It's also a good idea to know your test results and keep a list of the medicines you take. Where can you learn more? Go to https://chpepiceweb.[email or web address] org and sign in to your Kisstixx account. Enter O410 in the [facility] box to learn more about \"Tennis Elbow: Exercises. \"     If you do not have an account, please click on the \"Sign Up Now\" link. Current as of: November 16, 2020               Content Version: 12.8  © 2006-2021 [employer]. Care instructions adapted under license by [facility] ([facility]). If you have questions about a medical condition or this instruction, always ask your healthcare professional. [place] [address] any warranty or liability for your use of this information. Patient Education        Tennis Elbow: Care Instructions  Overview     Tennis elbow is soreness or pain on the outer part of the elbow. The pain occurs when the tendon is stretched and becomes irritated by repeated twisting of the hand, wrist, and forearm. A tendon is a tough tissue that connects muscle to bone. This injury is common in tennis players. But you also can get it from many activities that work the same muscles. Examples include gardening, painting, and using tools. Tennis elbow usually heals with rest and treatment at home. Follow-up care is a key part of your treatment and safety. Be sure to make and go to all appointments, and call your doctor if you are having problems. It's also a good idea to know your test results and keep a list of the medicines you take. How can you care for yourself at home?    · Rest your fingers, wrist, and forearm. Try to stop or reduce any activity that causes elbow pain. You may have to rest your arm for weeks to months. Follow your doctor's directions for how long to rest.     · Put ice or a cold pack on your elbow for 10 to 20 minutes at a time. Try to do this every 1 to 2 hours for the next 3 days (when you are awake) or until the swelling goes down.  Put a thin cloth between the ice and your skin. You can try heat, or alternating heat and ice, after the first 3 days.     · If your doctor gave you a brace or splint, use it as directed. A \"counterforce\" brace is a strap around your forearm, just below your elbow. It may ease the pressure on the tendon and spread force throughout your arm.     · Prop up your elbow on pillows to help reduce swelling.     · Follow your doctor's or physical therapist's directions for exercise.     · Return to your usual activities slowly.     · Try to prevent the problem. Learn the best techniques for your sport. For example, make sure the  on your tennis racquet is not too big for your hand. Try not to hit a tennis ball late in your swing.     · If you work, consider asking your employer about new ways of doing your job if your elbow pain is caused by something you do at work. Medicines    · Be safe with medicines. Read and follow all instructions on the label. ? If the doctor gave you a prescription medicine for pain, take it as prescribed. ? If you are not taking a prescription pain medicine, ask your doctor if you can take an over-the-counter medicine. When should you call for help? Call your doctor now or seek immediate medical care if:    · Your pain is worse.     · You cannot bend your elbow normally.     · Your arm or hand is cool or pale or changes color.     · You have tingling, weakness, or numbness in your hand and fingers. Watch closely for changes in your health, and be sure to contact your doctor if:    · You have work problems caused by your elbow pain.     · Your pain is not better after 2 weeks. Where can you learn more? Go to https://mike.Splashup. org and sign in to your [employer] account. Enter 0699 [id number] in the [facility] box to learn more about \"Tennis Elbow: Care Instructions. \"     If you do not have an account, please click on the \"Sign Up Now\" link.   Current as of: November 16, 2020               Content Version: 12.8  © 2888-8284 Healthwise, Incorporated. Care instructions adapted under license by [facility] ([facility]). If you have questions about a medical condition or this instruction, always ask your healthcare professional. [address] any warranty or liability for your use of this information.

## 2021-04-01 NOTE — PROGRESS NOTES
2021    Kennedy Salgado (:  1960) is a 63 y.o. female, here for a preventive medicine evaluation. Pt is here today to establish care. Previous pt of Dr. Clarisa Segura. Last visit with her was 3/2020. She is  with 3 grown children. Was laid off in January and is watching her grandchildren from time to time. She has over 20 acres with chickens and horses. Goes to the gym 4 days per week. H/o uterine cancer, s/p total hysterectomy. Sees Dr. Rita Kc with [facility]. Denies family h/o breast or colon CA. Due for mammogram 2021. Due for colonoscopy 3/2021. UTD on routine dental and vision exams. Pt states that she falls asleep well, but wakes up about 4 hours later to urinate, and then can not fall back asleep. Has tried melatonin, but it did not work. Patient Active Problem List   Diagnosis    KOURTNEY (stress urinary incontinence, female)    History of uterine cancer    Night sweats       Review of Systems   Constitutional: Negative. HENT: Negative. Eyes: Negative. Respiratory: Negative. Cardiovascular: Negative. Gastrointestinal: Negative. Genitourinary: Negative. Musculoskeletal: Negative. Skin: Negative. Neurological: Negative. Psychiatric/Behavioral: Positive for sleep disturbance. Negative for agitation, behavioral problems, confusion, decreased concentration, dysphoric mood, hallucinations, self-injury and suicidal ideas. The patient is not nervous/anxious and is not hyperactive. Prior to Visit Medications    Medication Sig Taking?  Authorizing Provider   traZODone (DESYREL) 50 MG tablet Take 1 tablet by mouth nightly as needed for Sleep Yes WILLIAM Granado CNP        No Known Allergies    Past Medical History:   Diagnosis Date    Menopausal symptoms     Uterine cancer ([facility] [address] 2011    at 50 stage 1       Past Surgical History:   Procedure Laterality Date    COLONOSCOPY      COLONOSCOPY  3/9/16    poor prep;no mass index is 22.96 kg/m² as calculated from the following:    Height as of this encounter: 5' 10\" (1.778 m). Weight as of this encounter: 160 lb (72.6 kg). Physical Exam  Vitals signs reviewed. Constitutional:       Appearance: Normal appearance. She is well-developed. She is not ill-appearing. HENT:      Head: Normocephalic. Eyes:      General: Lids are normal.         Right eye: No discharge. Left eye: No discharge. Neck:      Musculoskeletal: Full passive range of motion without pain and normal range of motion. Normal range of motion. No edema, crepitus or pain with movement. Thyroid: No thyroid mass, thyromegaly or thyroid tenderness. Vascular: Normal carotid pulses. No carotid bruit or JVD. Cardiovascular:      Rate and Rhythm: Normal rate and regular rhythm. Pulses: Normal pulses. Heart sounds: Normal heart sounds. No murmur. Pulmonary:      Effort: Pulmonary effort is normal.      Breath sounds: Normal breath sounds. Musculoskeletal: Normal range of motion. Lymphadenopathy:      Cervical: No cervical adenopathy. Right cervical: No superficial cervical adenopathy. Left cervical: No superficial cervical adenopathy. Skin:     General: Skin is warm and dry. Capillary Refill: Capillary refill takes less than 2 seconds. Neurological:      General: No focal deficit present. Mental Status: She is alert and oriented to person, place, and time. Psychiatric:         Mood and Affect: Mood normal.         Behavior: Behavior normal. Behavior is cooperative. Thought Content: Thought content normal.         Judgment: Judgment normal.         No flowsheet data found.     Lab Results   Component Value Date    CHOL 224 03/09/2020    TRIG 98 03/09/2020    HDL 69 03/09/2020    LDLCALC 135 03/09/2020    GLUCOSE 93 03/09/2020       The 10-year ASCVD risk score (Ana Kong et al., 2013) is: 2.1%    Values used to calculate the score:      Age: 63 years      Sex: Female      Is Non- : No      Diabetic: No      Tobacco smoker: No      Systolic Blood Pressure: 522 mmHg      Is BP treated: No      HDL Cholesterol: 69 mg/dL      Total Cholesterol: 224 mg/dL    Immunization History   Administered Date(s) Administered    Tdap (Boostrix, Adacel) 01/07/2019       Health Maintenance   Topic Date Due    COVID-19 Vaccine (1) Never done    Shingles Vaccine (1 of 2) Never done    Colon cancer screen colonoscopy  Never done    Flu vaccine (Season Ended) 09/01/2021    Breast cancer screen  05/29/2022    Lipid screen  03/09/2025    DTaP/Tdap/Td vaccine (3 - Td) 01/07/2029    Hepatitis C screen  Completed    Hepatitis A vaccine  Aged Out    Hepatitis B vaccine  Aged Out    Hib vaccine  Aged Out    Meningococcal (ACWY) vaccine  Aged Out    Pneumococcal 0-64 years Vaccine  Aged Out    HIV screen  Discontinued       ASSESSMENT/PLAN:  1. Encounter to establish care    2. Healthcare maintenance  Encouraged to continue healthy diet and exercise. UTD on routine dental and vision exams. Will return for fasting labs. -     CBC Auto Differential; Future  -     Comprehensive Metabolic Panel w/ Reflex to MG; Future  -     Lipid Panel; Future  -     T4, Free; Future  -     TSH without Reflex; Future    3. Encounter for screening mammogram for malignant neoplasm of breast  -     ANIL DIGITAL SCREEN W OR WO CAD BILATERAL; Future    4. Colon cancer screening  -     COLONOSCOPY W/ OR W/O BIOPSY  -     Silvana Croft MD, Gastroenterology, [facility]    5. Insomnia, unspecified type  -     traZODone (DESYREL) 50 MG tablet; Take 1 tablet by mouth nightly as needed for Sleep, Disp-30 tablet, R-0Normal    6. Lipoma of torso  Pt has a h/o lipoma's. Her previous PCP referred her to surgery, but she did not go b/c of COVID.   Would like another referral.    -     Dia Mcadams MD, General Surgery, [facility]      Return in about 1 year

## 2021-04-12 ENCOUNTER — INITIAL CONSULT (OUTPATIENT)
Dept: SURGERY | Age: 61
End: 2021-04-12
Payer: COMMERCIAL

## 2021-04-12 VITALS
BODY MASS INDEX: 24.77 KG/M2 | HEIGHT: 70 IN | WEIGHT: 173 LBS | DIASTOLIC BLOOD PRESSURE: 72 MMHG | SYSTOLIC BLOOD PRESSURE: 118 MMHG

## 2021-04-12 DIAGNOSIS — R19.06 EPIGASTRIC MASS: ICD-10-CM

## 2021-04-12 DIAGNOSIS — D17.1 LIPOMA OF TORSO: Primary | ICD-10-CM

## 2021-04-12 PROCEDURE — 99243 OFF/OP CNSLTJ NEW/EST LOW 30: CPT | Performed by: SURGERY

## 2021-04-12 NOTE — PROGRESS NOTES
New Patient Via Yung Puentes MD    [address], [address] [place] [address]  [place], [facility]  [phone]    Para Contes   YOB: 1960    Date of Visit:  4/12/2021    WILLIAM Baldwin - FABI    Chief Complaint: Lumps    HPI: Patient presents for evaluation of a lump on her posterior shoulder, mid back, and abdomen. She states she has had these for years. She has had previous lipomas removed and thought they were the same thing. They all seem to be getting a little bit larger. The shoulder lipoma is uncomfortable when she moves her arm.   The abdominal lump is uncomfortable if she lifts or strains    No Known Allergies  Outpatient Medications Marked as Taking for the 4/12/21 encounter (Initial consult) with Gera Adams MD   Medication Sig Dispense Refill    traZODone (DESYREL) 50 MG tablet Take 1 tablet by mouth nightly as needed for Sleep 30 tablet 0       Past Medical History:   Diagnosis Date    Menopausal symptoms     Uterine cancer ([address] 9/2011    at 50 stage 1     Past Surgical History:   Procedure Laterality Date    COLONOSCOPY      COLONOSCOPY  3/9/16    poor prep;no polyps    HYSTERECTOMY  9/2011    HYSTERECTOMY, TOTAL ABDOMINAL      LIPOMA RESECTION      abd.    TUBAL LIGATION       Family History   Problem Relation Age of Onset    High Blood Pressure Mother     Cancer Father         leukemia at 39    Cancer Brother         bladder cancer at 62     Social History     Socioeconomic History    Marital status:      Spouse name: Not on file    Number of children: Not on file    Years of education: Not on file    Highest education level: Not on file   Occupational History    Not on file   Social Needs    Financial resource strain: Not on file    Food insecurity     Worry: Not on file     Inability: Not on file    Transportation needs     Medical: Not on file     Non-medical: Not on file   Tobacco Use    Smoking status: Never Smoker    Smokeless tobacco: Never Used   Substance and Sexual Activity    Alcohol use: Yes     Alcohol/week: 9.0 standard drinks     Types: 9 Glasses of wine per week     Comment: weekends    Drug use: Never    Sexual activity: Yes   Lifestyle    Physical activity     Days per week: Not on file     Minutes per session: Not on file    Stress: Not on file   Relationships    Social connections     Talks on phone: Not on file     Gets together: Not on file     Attends Sabianism service: Not on file     Active member of club or organization: Not on file     Attends meetings of clubs or organizations: Not on file     Relationship status: Not on file    Intimate partner violence     Fear of current or ex partner: Not on file     Emotionally abused: Not on file     Physically abused: Not on file     Forced sexual activity: Not on file   Other Topics Concern    Not on file   Social History Narrative    Not on file          Vitals:    04/12/21 1024   BP: 118/72   Site: Left Upper Arm   Position: Sitting   Cuff Size: Large Adult   Weight: 173 lb (78.5 kg)   Height: 5' 10\" (1.778 m)     Body mass index is 24.82 kg/m². Wt Readings from Last 3 Encounters:   04/12/21 173 lb (78.5 kg)   04/01/21 160 lb (72.6 kg)   05/19/20 166 lb 12.8 oz (75.7 kg)     BP Readings from Last 3 Encounters:   04/12/21 118/72   04/01/21 112/80   05/19/20 118/74        REVIEW OF SYSTEMS:  CONSTITUTIONAL:  negative  HEENT:  Negative  RESPIRATORY:  negative  CARDIOVASCULAR:  negative  GASTROINTESTINAL:  negative  GENITOURINARY:  negative  HEMATOLOGIC/LYMPHATIC:  negative  ENDOCRINE:  Negative  NEUROLOGICAL:  Negative  * All other ROS reviewed and negative.      PE:  Constitutional:  Well developed, well nourished, no acute distress, non-toxic appearance   Eyes:  PERRL, conjunctiva normal   HENT:  Atraumatic, external ears normal, nose normal. Neck- normal range of motion, no tenderness, supple Respiratory:  No respiratory distress, normal breath sounds, no rales, no wheezing   Cardiovascular:  Normal rate, normal rhythm  GI: Bowel sounds positive, soft, nontender. In her upper midline she has a firm mobile soft tissue mass deep to a robotic trocar site  :  No costovertebral angle tenderness   Integument:  Well hydrated, no rash. She has about a 4 cm mobile soft tissue mass posterior to her right shoulder and a 1.5 cm mobile soft tissue mass in her mid back  Lymphatic:  No lymphadenopathy noted   Neurologic:  Alert & oriented x 3, no focal deficits noted   Psychiatric:  Speech and behavior appropriate       DATA:  CT ordered      Assessment:  1. Lipoma of torso    2. Epigastric mass        Plan: The shoulder mass and back mass likely represent lipomas. However, it is unclear whether the abdominal mass represents a lipoma or a hernia. CT scan will be obtained for more definitive evaluation. Further treatment plan will be based on the results of the CT imaging.   We will either schedule her for a lipoma removal or lipoma removal and hernia repair

## 2021-04-16 ENCOUNTER — HOSPITAL ENCOUNTER (OUTPATIENT)
Dept: CT IMAGING | Age: 61
Discharge: HOME | End: 2021-04-16
Payer: COMMERCIAL

## 2021-04-16 DIAGNOSIS — R19.06 EPIGASTRIC MASS: ICD-10-CM

## 2021-04-16 DIAGNOSIS — Z00.00 HEALTHCARE MAINTENANCE: ICD-10-CM

## 2021-04-16 LAB
A/G RATIO: 1.7 (ref 1.1–2.2)
ALBUMIN SERPL-MCNC: 4.5 G/DL (ref 3.4–5)
ALP BLD-CCNC: 82 U/L (ref 40–129)
ALT SERPL-CCNC: 15 U/L (ref 10–40)
ANION GAP SERPL CALCULATED.3IONS-SCNC: 10 MMOL/L (ref 3–16)
AST SERPL-CCNC: 18 U/L (ref 15–37)
BASOPHILS ABSOLUTE: 0 K/UL (ref 0–0.2)
BASOPHILS RELATIVE PERCENT: 0.5 %
BILIRUB SERPL-MCNC: 0.5 MG/DL (ref 0–1)
BUN BLDV-MCNC: 13 MG/DL (ref 7–20)
CALCIUM SERPL-MCNC: 9.4 MG/DL (ref 8.3–10.6)
CHLORIDE BLD-SCNC: 98 MMOL/L (ref 99–110)
CHOLESTEROL, TOTAL: 237 MG/DL (ref 0–199)
CO2: 26 MMOL/L (ref 21–32)
CREAT SERPL-MCNC: 0.7 MG/DL (ref 0.6–1.1)
EOSINOPHILS ABSOLUTE: 0.1 K/UL (ref 0–0.6)
EOSINOPHILS RELATIVE PERCENT: 1.3 %
GFR AFRICAN AMERICAN: >60
GFR NON-AFRICAN AMERICAN: >60
GLOBULIN: 2.7 G/DL
GLUCOSE BLD-MCNC: 95 MG/DL (ref 70–99)
HCT VFR BLD CALC: 38 % (ref 36–48)
HDLC SERPL-MCNC: 80 MG/DL (ref 40–60)
HEMOGLOBIN: 12.5 G/DL (ref 12–16)
LDL CHOLESTEROL CALCULATED: 143 MG/DL
LYMPHOCYTES ABSOLUTE: 2 K/UL (ref 1–5.1)
LYMPHOCYTES RELATIVE PERCENT: 45.9 %
MCH RBC QN AUTO: 29.6 PG (ref 26–34)
MCHC RBC AUTO-ENTMCNC: 33 G/DL (ref 31–36)
MCV RBC AUTO: 89.7 FL (ref 80–100)
MONOCYTES ABSOLUTE: 0.3 K/UL (ref 0–1.3)
MONOCYTES RELATIVE PERCENT: 8 %
NEUTROPHILS ABSOLUTE: 1.9 K/UL (ref 1.7–7.7)
NEUTROPHILS RELATIVE PERCENT: 44.3 %
PDW BLD-RTO: 14 % (ref 12.4–15.4)
PLATELET # BLD: 231 K/UL (ref 135–450)
PMV BLD AUTO: 9.7 FL (ref 5–10.5)
POTASSIUM REFLEX MAGNESIUM: 4.5 MMOL/L (ref 3.5–5.1)
RBC # BLD: 4.23 M/UL (ref 4–5.2)
SODIUM BLD-SCNC: 134 MMOL/L (ref 136–145)
T4 FREE: 1.1 NG/DL (ref 0.9–1.8)
TOTAL PROTEIN: 7.2 G/DL (ref 6.4–8.2)
TRIGL SERPL-MCNC: 69 MG/DL (ref 0–150)
TSH SERPL DL<=0.05 MIU/L-ACNC: 2.22 UIU/ML (ref 0.27–4.2)
VLDLC SERPL CALC-MCNC: 14 MG/DL
WBC # BLD: 4.4 K/UL (ref 4–11)

## 2021-04-16 PROCEDURE — 74150 CT ABDOMEN W/O CONTRAST: CPT

## 2021-04-27 ENCOUNTER — TELEPHONE (OUTPATIENT)
Dept: SURGERY | Age: 61
End: 2021-04-27

## 2021-04-27 DIAGNOSIS — Z01.818 PRE-OP TESTING: Primary | ICD-10-CM

## 2021-04-27 NOTE — TELEPHONE ENCOUNTER
----- Message from Miles Nuñez MD sent at 4/26/2021 10:16 AM EDT -----  Please call with CT. Abdominal lump represents a hernia.  OK to schedule for hernia repair and lipoma removal

## 2021-05-07 ENCOUNTER — TELEPHONE (OUTPATIENT)
Dept: GASTROENTEROLOGY | Age: 61
End: 2021-05-07

## 2021-06-03 ENCOUNTER — TELEPHONE (OUTPATIENT)
Dept: SURGERY | Age: 61
End: 2021-06-03

## 2021-06-03 NOTE — TELEPHONE ENCOUNTER
Pt called and said she needs to reschedule her hernia sx that is scheduled for 6/21. Please call her.

## 2021-06-03 NOTE — TELEPHONE ENCOUNTER
I called an spoke with pt, rescheduled for Monday Sept 20,21, 12:00 case, 10 am arrival time, I will mail her information prior to surgery. Pt already has pre op instructions.

## 2021-06-06 ENCOUNTER — HOSPITAL ENCOUNTER (EMERGENCY)
Age: 61
Discharge: HOME | End: 2021-06-06
Payer: COMMERCIAL

## 2021-06-06 VITALS
HEART RATE: 83 BPM | DIASTOLIC BLOOD PRESSURE: 76 MMHG | RESPIRATION RATE: 16 BRPM | OXYGEN SATURATION: 96 % | SYSTOLIC BLOOD PRESSURE: 115 MMHG | TEMPERATURE: 97.6 F

## 2021-06-06 DIAGNOSIS — S81.812A LACERATION OF LEFT LOWER EXTREMITY, INITIAL ENCOUNTER: Primary | ICD-10-CM

## 2021-06-06 PROCEDURE — 12002 RPR S/N/AX/GEN/TRNK2.6-7.5CM: CPT

## 2021-06-06 PROCEDURE — 99283 EMERGENCY DEPT VISIT LOW MDM: CPT

## 2021-06-06 ASSESSMENT — PAIN SCALES - GENERAL: PAINLEVEL_OUTOF10: 4

## 2021-06-07 ENCOUNTER — TELEPHONE (OUTPATIENT)
Dept: FAMILY MEDICINE CLINIC | Age: 61
End: 2021-06-07

## 2021-06-07 NOTE — ED PROVIDER NOTES
[address] [facility]  ED  eMERGENCY dEPARTMENT eNCOUnter        Pt Name: Tiffany Cabrera  MRN: [id number]  [place] 1960  Date of evaluation: 6/6/2021  Provider: Tank Mcdonough PA-C  PCP: WILLIAM Kan - FABI  ED Attending: Pati Cheung MD    Patient was not seen by the ED attending  History is provided by the patient    CHIEF COMPLAINT:  Laceration (right leg cut on wire fence  TDAP 2019)      HISTORY OF PRESENT ILLNESS:  Tiffany Cabrera is a 63 y.o. female who presents to the ED via private vehicle with complaints of laceration. Patient is here with a laceration to the posterior aspect of her left lower leg. The wound is somewhat jagged in nature and at least 4 to 5 cm in total length. Bleeding controlled. The patient reports she has chickens and dogs and protects the area where they are with a mesh, barbed wire electric fence. She turned the power off to the fence and was stepping over it when she got her leg caught. The wound is mildly painful. She rates the pain 4/10. She is up-to-date on her tetanus vaccine. No other complaints, modifying factors or associated symptoms. Nursing notes reviewed.    Past Medical History:   Diagnosis Date    Menopausal symptoms     Uterine cancer ([address] 9/2011    at 50 stage 1     Past Surgical History:   Procedure Laterality Date    COLONOSCOPY      COLONOSCOPY  3/9/16    poor prep;no polyps    HYSTERECTOMY  9/2011    HYSTERECTOMY, TOTAL ABDOMINAL      LIPOMA RESECTION      abd.    TUBAL LIGATION       Family History   Problem Relation Age of Onset    High Blood Pressure Mother     Cancer Father         leukemia at 39    Cancer Brother         bladder cancer at 62     Social History     Socioeconomic History    Marital status:      Spouse name: Not on file    Number of children: Not on file    Years of education: Not on file    Highest education level: Not on file   Occupational History    Not on file   Tobacco Use    Smoking status: Never Smoker    Smokeless tobacco: Never Used   Substance and Sexual Activity    Alcohol use: Yes     Alcohol/week: 9.0 standard drinks     Types: 9 Glasses of wine per week     Comment: weekends    Drug use: Never    Sexual activity: Yes   Other Topics Concern    Not on file   Social History Narrative    Not on file     Social Determinants of Health     Financial Resource Strain:     Difficulty of Paying Living Expenses:    Food Insecurity:     Worried About Running Out of Food in the Last Year:     [address] in the Last Year:    Transportation Needs:     Lack of Transportation (Medical):  Lack of Transportation (Non-Medical):    Physical Activity:     Days of Exercise per Week:     Minutes of Exercise per Session:    Stress:     Feeling of Stress :    Social Connections:     Frequency of Communication with Friends and Family:     Frequency of Social Gatherings with Friends and Family:     Attends Spiritism Services:     Active Member of Clubs or Organizations:     Attends Club or Organization Meetings:     Marital Status:    Intimate Partner Violence:     Fear of Current or Ex-Partner:     Emotionally Abused:     Physically Abused:     Sexually Abused:      No current facility-administered medications for this encounter. Current Outpatient Medications   Medication Sig Dispense Refill    traZODone (DESYREL) 50 MG tablet Take 1 tablet by mouth nightly as needed for Sleep 30 tablet 0     No Known Allergies    REVIEW OF SYSTEMS:  6 systems reviewed, pertinent positives per HPI otherwise noted to be negative. PHYSICAL EXAM:  /76   Pulse 83   Temp 97.6 °F (36.4 °C) (Axillary)   Resp 16   SpO2 96%   CONSTITUTIONAL: Awake and alert. Well-developed. Well-nourished. Non-toxic. Cooperative. No acute distress. HENT: Normocephalic. Atraumatic. External ears normal, without discharge. Nose normal. Mucous membranes moist.  EYES: Conjunctiva non-injected.  No scleral there is LOW risk for FRACTURE, TENDON OR NEUROVASCULAR INJURY, or RETAINED FOREIGN BODY, thus I consider the discharge disposition reasonable. Also, there is no evidence or peritonitis, sepsis, or toxicity. Ajit Alvarado and I have discussed the diagnosis and risks, and we agree with discharging home to follow-up with their primary doctor. We also discussed returning to the Emergency Department immediately if new or worsening symptoms occur. We have discussed the symptoms which are most concerning (e.g., changing or worsening pain, fever, numbness, weakness, cool or painful digits) that necessitate immediate return. CLINICAL IMPRESSION:  1. Laceration of left lower extremity, initial encounter        Blood pressure 115/76, pulse 83, temperature 97.6 °F (36.4 °C), temperature source Axillary, resp. rate 16, SpO2 96 %, not currently breastfeeding. PATIENT REFERRED TO:  WILLIAM Peña CNP  [address] [place] [address]  [phone]    Schedule an appointment as soon as possible for a visit           DISPOSITION  Patient was discharged to home in good condition.           Suad Villavicencio  06/06/21 1155

## 2021-06-07 NOTE — ED NOTES
Jeanette LAEGRE in room suturing pt at this time. Pt denies needs at this time.      Elio Valverde RN  06/06/21 5938

## 2021-06-07 NOTE — TELEPHONE ENCOUNTER
----- Message from Ivelisse Pleitez sent at 6/7/2021 10:15 AM EDT -----  Subject: Message to Provider    QUESTIONS  Information for Provider? Pt called to schedule stitches removal on their   left leg anytime after Brandi 15, 2021. please follow up with pt.  ---------------------------------------------------------------------------  --------------  CALL BACK INFO  What is the best way for the office to contact you? OK to leave message on   voicemail  Preferred Call Back Phone Number? [phone]  ---------------------------------------------------------------------------  --------------  SCRIPT ANSWERS  Relationship to Patient? Self  Appointment reason? Symptomatic  Select script based on patient symptoms? Adult No Script   (Is the patient requesting to be seen urgently for their symptoms?)? No  Is this follow up request related to routine Diabetes Management? No  Are you having any new concerns about your existing condition? No  (Is the patient requesting to see the provider for a procedure?)?  Yes

## 2021-06-07 NOTE — ED NOTES
Pt ok to d/c to home. Pt given d/c instructions. Pt verbalized understating including Rx and follow up care. Pt ambulated to lobby for ride home.  0 s/s of distress at time of d/c.          Татьяна Aaron RN  06/06/21 5169

## 2021-06-09 NOTE — TELEPHONE ENCOUNTER
Patient can't do 6/16 with Curt, she will driving back from being out of town that day. Earnestine's schedule is unavailable all the rest of that week and next. I put her with Dr. Jl Diehl on 6/21.

## 2021-06-21 ENCOUNTER — OFFICE VISIT (OUTPATIENT)
Dept: FAMILY MEDICINE CLINIC | Age: 61
End: 2021-06-21
Payer: COMMERCIAL

## 2021-06-21 VITALS
HEIGHT: 70 IN | HEART RATE: 77 BPM | OXYGEN SATURATION: 98 % | DIASTOLIC BLOOD PRESSURE: 72 MMHG | SYSTOLIC BLOOD PRESSURE: 112 MMHG | BODY MASS INDEX: 24.37 KG/M2 | WEIGHT: 170.2 LBS

## 2021-06-21 DIAGNOSIS — Z48.02 VISIT FOR SUTURE REMOVAL: Primary | ICD-10-CM

## 2021-06-21 DIAGNOSIS — S81.812S LEG LACERATION, LEFT, SEQUELA: ICD-10-CM

## 2021-06-21 PROCEDURE — 99212 OFFICE O/P EST SF 10 MIN: CPT | Performed by: FAMILY MEDICINE

## 2021-06-21 ASSESSMENT — ENCOUNTER SYMPTOMS: ROS SKIN COMMENTS: SEE PHOTO.

## 2021-06-21 NOTE — PATIENT INSTRUCTIONS
Visit for suture removal  Sutures removed-use large bandage for the next 4 to 5 days and leave open at night-may use a small amount of Neosporin cream or ointment. Notify me of any drainage or increasing redness or pain.   Leg laceration, left, sequela

## 2021-06-21 NOTE — PROGRESS NOTES
Subjective:      Patient ID: Jossue Chang is a 63 y.o. female. HPI  Patient in for check on wound that occurred 2 weeks ago when stepping over a chicken fence and produced a laceration on the posterior lower left leg. She apparently has approximately 11 or 12 stitches in this wound and they are ready to be removed. Prior to Visit Medications :  Medication traZODone (DESYREL) 50 MG tablet, Sig Take 1 tablet by mouth nightly as needed for Sleep  Patient not taking: Reported on 6/21/2021, Taking? , Authorizing Provider WILLIAM Baptiste - CNP      Past Medical History:  No date: Menopausal symptoms  9/2011: Uterine cancer ([facility] [address]      Comment:  at 50 stage 1        Review of Systems    Review of Systems   Constitutional: Negative for fever. Skin: Positive for wound. See photo. Objective:   Physical Exam      Physical Exam  Skin:     Comments: Sutures removed from wound-see photo. Assessment:      1. Visit for suture removal    2. Leg laceration, left, sequela              Plan:      Mandie Bermudez was seen today for suture / staple removal.    Diagnoses and all orders for this visit:  Mandie Bermudez was seen today for suture / staple removal.    Diagnoses and all orders for this visit:    Visit for suture removal  Sutures removed-use large bandage for the next 4 to 5 days and leave open at night-may use a small amount of Neosporin cream or ointment. Notify me of any drainage or increasing redness or pain.   Leg laceration, left, sequela              Govind Faulkner,

## 2021-09-03 ENCOUNTER — TELEPHONE (OUTPATIENT)
Dept: SURGERY | Age: 61
End: 2021-09-03

## 2021-09-03 NOTE — TELEPHONE ENCOUNTER
Pt scheduled for hernia repair/lipoma/ 9/20/21 9 am arrival time for 11 am procedure, advised NPO avoid aspirin products 5 days prior, she will have covid test week prior, and she has a . Directions given for surgery.

## 2021-09-16 ENCOUNTER — HOSPITAL ENCOUNTER (OUTPATIENT)
Age: 61
Discharge: HOME | End: 2021-09-16
Payer: COMMERCIAL

## 2021-09-16 PROCEDURE — U0005 INFEC AGEN DETEC AMPLI PROBE: HCPCS

## 2021-09-16 PROCEDURE — U0003 INFECTIOUS AGENT DETECTION BY NUCLEIC ACID (DNA OR RNA); SEVERE ACUTE RESPIRATORY SYNDROME CORONAVIRUS 2 (SARS-COV-2) (CORONAVIRUS DISEASE [COVID-19]), AMPLIFIED PROBE TECHNIQUE, MAKING USE OF HIGH THROUGHPUT TECHNOLOGIES AS DESCRIBED BY CMS-2020-01-R: HCPCS

## 2021-09-16 NOTE — PROGRESS NOTES
Elsie Aguilar    Age 63 y.o.    female    1960    MRN [id number]    9/20/2021  Arrival Time_____________  OR Time____________[address]     Procedure(s):  INCISIONAL HERNIA REPAIR WITH MESH, EXCISION OF LIPOMA ON TORSO TIMES TWO  CPT CODE - 33691 X2                      Monitor Anesthesia Care    Surgeon(s):  Joselin Bowling, MD       Phone [phone] (home)     Initals  Date  [address]  Cell         Work  _____________________________________________________________________  _____________________________________________________________________  _____________________________________________________________________  _____________________________________________________________________  _____________________________________________________________________    PCP _____________________________ Phone_________________     H&P__________________Bringing      Chart            Epic   DOS      Called________  EKG__________________Bringing      Chart            Epic   DOS      Called________  LAB__________________ Bringing      Chart            Epic   DOS      Called________  Cardiac Clearance_______Bringing      Chart            Epic      DOS      Called________    Cardiologist________________________ Phone___________________________    ? Muslim concerns / Waiver on Chart            [employer]________________  ? Pre-op Instructions Given [place]          _________________________________  ? Directions to Surgery Center                          _________________________________  ? Transportation Home_______________      __________________________________  ?  Crutches/Walker__________________        __________________________________    ________Pre-op Orders   _______Transcribed    _______Wt.  ________Pharmacy          _______SCD  ______VTE     ______TED [facility]  _______  Surgery Consent    _______  Anesthesia Consent         COVID DATE______________LOCATION________________ RESULT__________

## 2021-09-17 ENCOUNTER — ANESTHESIA EVENT (OUTPATIENT)
Dept: OPERATING ROOM | Age: 61
End: 2021-09-17
Payer: COMMERCIAL

## 2021-09-17 LAB — SARS-COV-2: NOT DETECTED

## 2021-09-17 NOTE — PROGRESS NOTES
Preoperative Screening for Elective Surgery/Invasive Procedures While COVID-19 present in the community     Have you had any of the following symptoms? No  o Fever, chills  o Cough  o Shortness of breath  o Muscle aches/pain  o Diarrhea  o Abdominal pain, nausea, vomiting  o Loss or decrease in taste and / or smell   Risk of Exposure  o Have you recently been hospitalized for COVID-19 or flu-like illness, if so when? No  o Recently diagnosed with COVID-19, if so when? No  o Recently tested for COVID-19, if so when? 9/16/21  o Have you been in close contact with a person or family member who currently has or recently had COVID-23? If yes, when and in what context? No  o Do you live with anybody who in the last 14 days has had fever, chills, shortness of breath, muscle aches, flu-like illness? No  o Do you have any close contacts or family members who are currently in the hospital for COVID-19 or flu-like illness? No  If yes, assess recent close contact with this person. Indicate if the patient has a positive screen by answering yes to one or more of the above questions. Patients who test positive or screen positive prior to surgery or on the day of surgery should be evaluated in conjunction with the surgeon/proceduralist/anesthesiologist to determine the urgency of the procedure.

## 2021-09-17 NOTE — PROGRESS NOTES
Obstructive Sleep Apnea (OSCAR) Screening     Patient:  Deejay Panchal    YOB: 1960      Medical Record #:  [id number]                     Date:  9/17/2021     1. Are you a loud and/or regular snorer? []  Yes       [x] No    2. Have you been observed to gasp or stop breathing during sleep? []  Yes       [x] No    3. Do you feel tired or groggy upon awakening or do you awaken with a headache?           []  Yes       [] No    4. Are you often tired or fatigued during the wake time hours? []  Yes       [] No    5. Do you fall asleep sitting, reading, watching TV or driving? []  Yes       [] No    6. Do you often have problems with memory or concentration? []  Yes       [] No    **If patient's score is ? 3 they are considered high risk for OSCAR. An Anesthesia provider will evaluate the patient and develop a plan of care the day of surgery. Note:  If the patient's BMI is more than 35 kg m¯² , has neck circumference > 40 cm, and/or high blood pressure the risk is greater (© American Sleep Apnea Association, 2006).

## 2021-09-20 ENCOUNTER — HOSPITAL ENCOUNTER (OUTPATIENT)
Age: 61
Setting detail: OUTPATIENT SURGERY
Discharge: HOME | End: 2021-09-20
Attending: SURGERY | Admitting: SURGERY
Payer: COMMERCIAL

## 2021-09-20 ENCOUNTER — ANESTHESIA (OUTPATIENT)
Dept: OPERATING ROOM | Age: 61
End: 2021-09-20
Payer: COMMERCIAL

## 2021-09-20 VITALS
WEIGHT: 169 LBS | DIASTOLIC BLOOD PRESSURE: 79 MMHG | BODY MASS INDEX: 24.2 KG/M2 | OXYGEN SATURATION: 100 % | SYSTOLIC BLOOD PRESSURE: 115 MMHG | TEMPERATURE: 97 F | HEIGHT: 70 IN | HEART RATE: 54 BPM | RESPIRATION RATE: 14 BRPM

## 2021-09-20 VITALS — SYSTOLIC BLOOD PRESSURE: 99 MMHG | OXYGEN SATURATION: 97 % | DIASTOLIC BLOOD PRESSURE: 59 MMHG

## 2021-09-20 DIAGNOSIS — D17.1 LIPOMA OF TORSO: ICD-10-CM

## 2021-09-20 DIAGNOSIS — K43.2 INCISIONAL HERNIA, WITHOUT OBSTRUCTION OR GANGRENE: ICD-10-CM

## 2021-09-20 PROCEDURE — 6370000000 HC RX 637 (ALT 250 FOR IP): Performed by: ANESTHESIOLOGY

## 2021-09-20 PROCEDURE — 2500000003 HC RX 250 WO HCPCS: Performed by: NURSE ANESTHETIST, CERTIFIED REGISTERED

## 2021-09-20 PROCEDURE — 2709999900 HC NON-CHARGEABLE SUPPLY: Performed by: SURGERY

## 2021-09-20 PROCEDURE — 23073 EXC SHOULDER TUM DEEP 5 CM/>: CPT | Performed by: SURGERY

## 2021-09-20 PROCEDURE — 3700000001 HC ADD 15 MINUTES (ANESTHESIA): Performed by: SURGERY

## 2021-09-20 PROCEDURE — 2580000003 HC RX 258: Performed by: ANESTHESIOLOGY

## 2021-09-20 PROCEDURE — 2580000003 HC RX 258: Performed by: SURGERY

## 2021-09-20 PROCEDURE — 49561 PR REPAIR INCISIONAL HERNIA,STRANG: CPT | Performed by: SURGERY

## 2021-09-20 PROCEDURE — 2500000003 HC RX 250 WO HCPCS: Performed by: SURGERY

## 2021-09-20 PROCEDURE — 88304 TISSUE EXAM BY PATHOLOGIST: CPT

## 2021-09-20 PROCEDURE — 6360000002 HC RX W HCPCS: Performed by: NURSE ANESTHETIST, CERTIFIED REGISTERED

## 2021-09-20 PROCEDURE — 7100000011 HC PHASE II RECOVERY - ADDTL 15 MIN: Performed by: SURGERY

## 2021-09-20 PROCEDURE — 3600000003 HC SURGERY LEVEL 3 BASE: Performed by: SURGERY

## 2021-09-20 PROCEDURE — 3700000000 HC ANESTHESIA ATTENDED CARE: Performed by: SURGERY

## 2021-09-20 PROCEDURE — 7100000010 HC PHASE II RECOVERY - FIRST 15 MIN: Performed by: SURGERY

## 2021-09-20 PROCEDURE — 3600000013 HC SURGERY LEVEL 3 ADDTL 15MIN: Performed by: SURGERY

## 2021-09-20 RX ORDER — OXYCODONE HYDROCHLORIDE AND ACETAMINOPHEN 5; 325 MG/1; MG/1
1 TABLET ORAL PRN
Status: COMPLETED | OUTPATIENT
Start: 2021-09-20 | End: 2021-09-20

## 2021-09-20 RX ORDER — SODIUM CHLORIDE, SODIUM LACTATE, POTASSIUM CHLORIDE, CALCIUM CHLORIDE 600; 310; 30; 20 MG/100ML; MG/100ML; MG/100ML; MG/100ML
INJECTION, SOLUTION INTRAVENOUS CONTINUOUS
Status: DISCONTINUED | OUTPATIENT
Start: 2021-09-20 | End: 2021-09-20 | Stop reason: HOSPADM

## 2021-09-20 RX ORDER — OXYCODONE HYDROCHLORIDE AND ACETAMINOPHEN 5; 325 MG/1; MG/1
1 TABLET ORAL EVERY 6 HOURS PRN
Qty: 20 TABLET | Refills: 0 | Status: SHIPPED | OUTPATIENT
Start: 2021-09-20 | End: 2021-09-25

## 2021-09-20 RX ORDER — OXYCODONE HYDROCHLORIDE AND ACETAMINOPHEN 5; 325 MG/1; MG/1
2 TABLET ORAL PRN
Status: COMPLETED | OUTPATIENT
Start: 2021-09-20 | End: 2021-09-20

## 2021-09-20 RX ORDER — MIDAZOLAM HYDROCHLORIDE 1 MG/ML
INJECTION INTRAMUSCULAR; INTRAVENOUS PRN
Status: DISCONTINUED | OUTPATIENT
Start: 2021-09-20 | End: 2021-09-20 | Stop reason: SDUPTHER

## 2021-09-20 RX ORDER — CEFAZOLIN SODIUM 1 G/3ML
INJECTION, POWDER, FOR SOLUTION INTRAMUSCULAR; INTRAVENOUS PRN
Status: DISCONTINUED | OUTPATIENT
Start: 2021-09-20 | End: 2021-09-20 | Stop reason: SDUPTHER

## 2021-09-20 RX ORDER — PROMETHAZINE HYDROCHLORIDE 25 MG/ML
6.25 INJECTION, SOLUTION INTRAMUSCULAR; INTRAVENOUS
Status: DISCONTINUED | OUTPATIENT
Start: 2021-09-20 | End: 2021-09-20 | Stop reason: HOSPADM

## 2021-09-20 RX ORDER — LABETALOL HYDROCHLORIDE 5 MG/ML
5 INJECTION, SOLUTION INTRAVENOUS EVERY 10 MIN PRN
Status: DISCONTINUED | OUTPATIENT
Start: 2021-09-20 | End: 2021-09-20 | Stop reason: HOSPADM

## 2021-09-20 RX ORDER — ONDANSETRON 2 MG/ML
INJECTION INTRAMUSCULAR; INTRAVENOUS PRN
Status: DISCONTINUED | OUTPATIENT
Start: 2021-09-20 | End: 2021-09-20 | Stop reason: SDUPTHER

## 2021-09-20 RX ORDER — MORPHINE SULFATE 2 MG/ML
1 INJECTION, SOLUTION INTRAMUSCULAR; INTRAVENOUS EVERY 5 MIN PRN
Status: DISCONTINUED | OUTPATIENT
Start: 2021-09-20 | End: 2021-09-20 | Stop reason: HOSPADM

## 2021-09-20 RX ORDER — DIPHENHYDRAMINE HYDROCHLORIDE 50 MG/ML
12.5 INJECTION INTRAMUSCULAR; INTRAVENOUS
Status: DISCONTINUED | OUTPATIENT
Start: 2021-09-20 | End: 2021-09-20 | Stop reason: HOSPADM

## 2021-09-20 RX ORDER — MAGNESIUM HYDROXIDE 1200 MG/15ML
LIQUID ORAL CONTINUOUS PRN
Status: COMPLETED | OUTPATIENT
Start: 2021-09-20 | End: 2021-09-20

## 2021-09-20 RX ORDER — LIDOCAINE HYDROCHLORIDE 20 MG/ML
INJECTION, SOLUTION INFILTRATION; PERINEURAL PRN
Status: DISCONTINUED | OUTPATIENT
Start: 2021-09-20 | End: 2021-09-20 | Stop reason: SDUPTHER

## 2021-09-20 RX ORDER — MORPHINE SULFATE 2 MG/ML
2 INJECTION, SOLUTION INTRAMUSCULAR; INTRAVENOUS EVERY 5 MIN PRN
Status: DISCONTINUED | OUTPATIENT
Start: 2021-09-20 | End: 2021-09-20 | Stop reason: HOSPADM

## 2021-09-20 RX ORDER — HYDRALAZINE HYDROCHLORIDE 20 MG/ML
5 INJECTION INTRAMUSCULAR; INTRAVENOUS EVERY 10 MIN PRN
Status: DISCONTINUED | OUTPATIENT
Start: 2021-09-20 | End: 2021-09-20 | Stop reason: HOSPADM

## 2021-09-20 RX ORDER — PROPOFOL 10 MG/ML
INJECTION, EMULSION INTRAVENOUS PRN
Status: DISCONTINUED | OUTPATIENT
Start: 2021-09-20 | End: 2021-09-20 | Stop reason: SDUPTHER

## 2021-09-20 RX ORDER — FENTANYL CITRATE 50 UG/ML
INJECTION, SOLUTION INTRAMUSCULAR; INTRAVENOUS PRN
Status: DISCONTINUED | OUTPATIENT
Start: 2021-09-20 | End: 2021-09-20 | Stop reason: SDUPTHER

## 2021-09-20 RX ORDER — MEPERIDINE HYDROCHLORIDE 50 MG/ML
12.5 INJECTION INTRAMUSCULAR; INTRAVENOUS; SUBCUTANEOUS EVERY 5 MIN PRN
Status: DISCONTINUED | OUTPATIENT
Start: 2021-09-20 | End: 2021-09-20 | Stop reason: HOSPADM

## 2021-09-20 RX ORDER — ONDANSETRON 2 MG/ML
4 INJECTION INTRAMUSCULAR; INTRAVENOUS PRN
Status: DISCONTINUED | OUTPATIENT
Start: 2021-09-20 | End: 2021-09-20 | Stop reason: HOSPADM

## 2021-09-20 RX ORDER — KETOROLAC TROMETHAMINE 30 MG/ML
INJECTION, SOLUTION INTRAMUSCULAR; INTRAVENOUS PRN
Status: DISCONTINUED | OUTPATIENT
Start: 2021-09-20 | End: 2021-09-20 | Stop reason: SDUPTHER

## 2021-09-20 RX ADMIN — KETOROLAC TROMETHAMINE 30 MG: 30 INJECTION, SOLUTION INTRAMUSCULAR; INTRAVENOUS at 11:42

## 2021-09-20 RX ADMIN — PROPOFOL 30 MG: 10 INJECTION, EMULSION INTRAVENOUS at 11:22

## 2021-09-20 RX ADMIN — KETOROLAC TROMETHAMINE 30 MG: 30 INJECTION, SOLUTION INTRAMUSCULAR; INTRAVENOUS at 11:40

## 2021-09-20 RX ADMIN — MIDAZOLAM HYDROCHLORIDE 2 MG: 2 INJECTION, SOLUTION INTRAMUSCULAR; INTRAVENOUS at 11:03

## 2021-09-20 RX ADMIN — PROPOFOL 150 MG: 10 INJECTION, EMULSION INTRAVENOUS at 11:11

## 2021-09-20 RX ADMIN — PHENYLEPHRINE HYDROCHLORIDE 100 MCG: 10 INJECTION INTRAVENOUS at 11:31

## 2021-09-20 RX ADMIN — ONDANSETRON 4 MG: 2 INJECTION INTRAMUSCULAR; INTRAVENOUS at 11:06

## 2021-09-20 RX ADMIN — PHENYLEPHRINE HYDROCHLORIDE 50 MCG: 10 INJECTION INTRAVENOUS at 11:21

## 2021-09-20 RX ADMIN — SODIUM CHLORIDE, POTASSIUM CHLORIDE, SODIUM LACTATE AND CALCIUM CHLORIDE: 600; 310; 30; 20 INJECTION, SOLUTION INTRAVENOUS at 10:02

## 2021-09-20 RX ADMIN — OXYCODONE HYDROCHLORIDE AND ACETAMINOPHEN 1 TABLET: 5; 325 TABLET ORAL at 12:18

## 2021-09-20 RX ADMIN — PHENYLEPHRINE HYDROCHLORIDE 50 MCG: 10 INJECTION INTRAVENOUS at 11:25

## 2021-09-20 RX ADMIN — FENTANYL CITRATE 25 MCG: 50 INJECTION INTRAMUSCULAR; INTRAVENOUS at 11:27

## 2021-09-20 RX ADMIN — LIDOCAINE HYDROCHLORIDE 80 MG: 20 INJECTION, SOLUTION INFILTRATION; PERINEURAL at 11:08

## 2021-09-20 RX ADMIN — CEFAZOLIN 2000 MG: 1 INJECTION, POWDER, FOR SOLUTION INTRAMUSCULAR; INTRAVENOUS at 11:03

## 2021-09-20 RX ADMIN — FENTANYL CITRATE 25 MCG: 50 INJECTION INTRAMUSCULAR; INTRAVENOUS at 11:10

## 2021-09-20 RX ADMIN — PROPOFOL 50 MG: 10 INJECTION, EMULSION INTRAVENOUS at 11:08

## 2021-09-20 ASSESSMENT — PULMONARY FUNCTION TESTS
PIF_VALUE: 0
PIF_VALUE: 2
PIF_VALUE: 1
PIF_VALUE: 1
PIF_VALUE: 0
PIF_VALUE: 1
PIF_VALUE: 2
PIF_VALUE: 2
PIF_VALUE: 0
PIF_VALUE: 1
PIF_VALUE: 0
PIF_VALUE: 0
PIF_VALUE: 2
PIF_VALUE: 0
PIF_VALUE: 1
PIF_VALUE: 0
PIF_VALUE: 1
PIF_VALUE: 0
PIF_VALUE: 0
PIF_VALUE: 1
PIF_VALUE: 0
PIF_VALUE: 1
PIF_VALUE: 0
PIF_VALUE: 2
PIF_VALUE: 0
PIF_VALUE: 1
PIF_VALUE: 0
PIF_VALUE: 1
PIF_VALUE: 0
PIF_VALUE: 1
PIF_VALUE: 0
PIF_VALUE: 0
PIF_VALUE: 2
PIF_VALUE: 1
PIF_VALUE: 0
PIF_VALUE: 1
PIF_VALUE: 1
PIF_VALUE: 0

## 2021-09-20 ASSESSMENT — PAIN - FUNCTIONAL ASSESSMENT: PAIN_FUNCTIONAL_ASSESSMENT: 0-10

## 2021-09-20 ASSESSMENT — PAIN SCALES - GENERAL
PAINLEVEL_OUTOF10: 4
PAINLEVEL_OUTOF10: 0
PAINLEVEL_OUTOF10: 3
PAINLEVEL_OUTOF10: 0

## 2021-09-20 NOTE — H&P
[address], MD     [address], [address] [place] [address]  [place], [facility]  [phone] [address] Shruthi Hutchins   YOB: 1960     Date of Visit:  4/12/2021     WILLIAM Roberson - CNP     Chief Complaint: Lumps     HPI: Patient presents for evaluation of a lump on her posterior shoulder, mid back, and abdomen. She states she has had these for years. She has had previous lipomas removed and thought they were the same thing. They all seem to be getting a little bit larger. The shoulder lipoma is uncomfortable when she moves her arm. The abdominal lump is uncomfortable if she lifts or strains     No Known Allergies  Active Medications          Outpatient Medications Marked as Taking for the 4/12/21 encounter (Initial consult) with Joe Avila MD   Medication Sig Dispense Refill    traZODone (DESYREL) 50 MG tablet Take 1 tablet by mouth nightly as needed for Sleep 30 tablet 0            Past Medical History        Past Medical History:   Diagnosis Date    Menopausal symptoms      Uterine cancer ([address] 9/2011     at 50 stage 1         Past Surgical History         Past Surgical History:   Procedure Laterality Date    COLONOSCOPY        COLONOSCOPY   3/9/16     poor prep;no polyps    HYSTERECTOMY   9/2011    HYSTERECTOMY, TOTAL ABDOMINAL        LIPOMA RESECTION         abd.     TUBAL LIGATION             Family History         Family History   Problem Relation Age of Onset    High Blood Pressure Mother      Cancer Father           leukemia at 43    Cancer Brother           bladder cancer at 62         Social History               Socioeconomic History    Marital status:        Spouse name: Not on file    Number of children: Not on file    Years of education: Not on file    Highest education level: Not on file   Occupational History    Not on file   Social Needs    Financial resource strain: Not on file   Abiodun-Vion Rosanne Shrestha NP is present. Patient of Dr. Abimael Dupont.     Camila is here today for catheter change.  Catheter change was done per order of Dr. Abimael Dupont.  The existing catheter was removed without difficulty.  A new 16 FrenchLatexStraight was inserted without any difficulty.  The bladder was irrigated until clear.  The patient was hooked up to a existing leg bag.  Patient was also given night bag. Patient tolerated procedure well.  she will return to see us again in 4 weeks.     Diagnosis: urinary incontintance    Above preformed by Trish CRANE MA    Addendum:     This was a nurse only visit. No nursing concerns during today's visit.     Rosanne Shrestha FN -BC        Patient returned to clinic as she feels that the 16 F catheter is leaking significantly more than the 18 F. We discussed that we can check placement of the catheter and if there is proper placement but significant leakage we can go back to 18F size. I would not recommend increasing the size further without discussion with Dr. Dupont about our options as we may cause more erosion of the tissues/urethra. Patient has been on oxybutynin and did not like the side effects. She tried myrbetriq for about 1-2 weeks and since she was not seeing much change she d/c'd on her own. We discussed that she may need to try medication for closer to a month to see full results. She is not sure that she wants to retry it. We also discussed using leg bag instead of the belly bag to have gravity help us with drainage. She does not want to use the leg bag in public. recommended she try it at home and see if this makes a difference to the amount of leakage.  Nursing will check the catheter and possibly change to 18F. She will have catheter change in 1 month and we can discuss case with Dr. Dupont in the meantime.     Rosanne SHERMAN     insecurity       Worry: Not on file       Inability: Not on file    Transportation needs       Medical: Not on file       Non-medical: Not on file   Tobacco Use    Smoking status: Never Smoker    Smokeless tobacco: Never Used   Substance and Sexual Activity    Alcohol use:  Yes       Alcohol/week: 9.0 standard drinks       Types: 9 Glasses of wine per week       Comment: weekends    Drug use: Never    Sexual activity: Yes   Lifestyle    Physical activity       Days per week: Not on file       Minutes per session: Not on file    Stress: Not on file   Relationships    Social connections       Talks on phone: Not on file       Gets together: Not on file       Attends Lutheran service: Not on file       Active member of club or organization: Not on file       Attends meetings of clubs or organizations: Not on file       Relationship status: Not on file    Intimate partner violence       Fear of current or ex partner: Not on file       Emotionally abused: Not on file       Physically abused: Not on file       Forced sexual activity: Not on file   Other Topics Concern    Not on file   Social History Narrative    Not on file              /79   Pulse 65   Temp 98.2 °F (36.8 °C) (Temporal)   Resp 20   Ht 5' 10\" (1.778 m)   Wt 169 lb (76.7 kg)   SpO2 96%   BMI 24.25 kg/m²        REVIEW OF SYSTEMS:  CONSTITUTIONAL:  negative  HEENT:  Negative  RESPIRATORY:  negative  CARDIOVASCULAR:  negative  GASTROINTESTINAL:  negative  GENITOURINARY:  negative  HEMATOLOGIC/LYMPHATIC:  negative  ENDOCRINE:  Negative  NEUROLOGICAL:  Negative  * All other ROS reviewed and negative.      PE:  Constitutional:  Well developed, well nourished, no acute distress, non-toxic appearance   Eyes:  PERRL, conjunctiva normal   HENT:  Atraumatic, external ears normal, nose normal. Neck- normal range of motion, no tenderness, supple   Respiratory:  No respiratory distress, normal breath sounds, no rales, no wheezing Cardiovascular:  Normal rate, normal rhythm  GI: Bowel sounds positive, soft, nontender. In her upper midline she has a firm mobile soft tissue mass deep to a robotic trocar site  :  No costovertebral angle tenderness   Integument:  Well hydrated, no rash. She has about a 4 cm mobile soft tissue mass posterior to her right shoulder and a 1.5 cm mobile soft tissue mass in her mid back  Lymphatic:  No lymphadenopathy noted   Neurologic:  Alert & oriented x 3, no focal deficits noted   Psychiatric:  Speech and behavior appropriate         DATA:  CT ordered        Assessment:  1. Lipoma of torso    2. Epigastric mass          Plan: Excision of lipomas and hernia repair with possible mesh. I explained the procedure including risks, benefits, and alternatives.  Questions were answered and the patient agrees to proceed.

## 2021-09-20 NOTE — BRIEF OP NOTE
Brief Postoperative Note      Patient: Kemar Harris  YOB: 1960  MRN: [id number]    Date of Procedure: 9/20/2021    Pre-Op Diagnosis: LIPOMA OF TORSO, INCISIONAL HERNIA    Post-Op Diagnosis: Same       Procedure(s):  INCARCERATED INCISIONAL HERNIA REPAIR, EXCISION OF LIPOMA ON SHOULDER    Surgeon(s):  Mamta Kent MD    Assistant:  Surgical Assistant: Arelis Dolan    Anesthesia: Monitor Anesthesia Care    Estimated Blood Loss (mL): Minimal    Complications: None    Specimens:   ID Type Source Tests Collected by Time Destination   A : back lipoma  Specimen Back SURGICAL PATHOLOGY Mamta Kent MD 9/20/2021 1121        Implants:  * No implants in log *      Drains: * No LDAs found *    Findings: as above    Electronically signed by Fermin Martin MD on 9/20/2021 at 11:43 AM

## 2021-09-20 NOTE — ANESTHESIA POSTPROCEDURE EVALUATION
Department of Anesthesiology  Postprocedure Note    Patient: Kaz Grullon  MRN: [id number]  YOB: 1960  Date of evaluation: 9/20/2021  Time:  2:32 PM     Procedure Summary     Date: 09/20/21 Room / Location: [address]    Anesthesia Start: 1103 Anesthesia Stop: 1329    Procedure: INCISIONAL HERNIA REPAIR WITH MESH, EXCISION OF LIPOMA ON TORSO TIMES TWO  CPT CODE - 61876 X2 (N/A Abdomen) Diagnosis:       Lipoma of torso      Incisional hernia, without obstruction or gangrene      (LIPOMA OF TORSO, INCISIONAL HERNIA)    Surgeons: Katie Hargrove MD Responsible Provider: Olga Dumas MD    Anesthesia Type: MAC ASA Status: 1          Anesthesia Type: MAC    Guero Phase I: Guero Score: 10    Guero Phase II: Guero Score: 10    Last vitals: Reviewed and per EMR flowsheets.        Anesthesia Post Evaluation    Comments: Postoperative Anesthesia Note    Name:    Kaz Grullon  MRN:      [id number]    Patient Vitals in the past 12 hrs:  09/20/21 1245, BP:115/79, Pulse:54, Resp:14, SpO2:100 %  09/20/21 1230, BP:116/74, Pulse:56, Resp:15, SpO2:100 %  09/20/21 1215, BP:113/76, Pulse:63, Resp:14, SpO2:100 %  09/20/21 1210, BP:110/80, Temp:97 °F (36.1 °C), Pulse:60, Resp:15, SpO2:98 %  09/20/21 1200, BP:107/71, Pulse:59, Resp:14, SpO2:99 %  09/20/21 1155, BP:112/70, Pulse:62, Resp:12, SpO2:96 %  09/20/21 1152, BP:109/71, Temp:97 °F (36.1 °C), Pulse:76, Resp:14, SpO2:96 %  09/20/21 0945, BP:111/79, Temp:98.2 °F (36.8 °C), Temp src:Temporal, Pulse:65, Resp:20, SpO2:96 %, Height:5' 10\" (1.778 m), Weight:169 lb (76.7 kg)     LABS:    CBC  Lab Results       Component                Value               Date/Time                  WBC                      4.4                 04/16/2021 11:31 AM        HGB                      12.5                04/16/2021 11:31 AM        HCT                      38.0                04/16/2021 11:31 AM        PLT                      231 04/16/2021 11:31 AM   RENAL  Lab Results       Component                Value               Date/Time                  NA                       134 (L)             04/16/2021 11:31 AM        K                        4.5                 04/16/2021 11:31 AM        CL                       98 (L)              04/16/2021 11:31 AM        CO2                      26                  04/16/2021 11:31 AM        BUN                      13                  04/16/2021 11:31 AM        CREATININE               0.7                 04/16/2021 11:31 AM        GLUCOSE                  95                  04/16/2021 11:31 AM   COAGS  No results found for: PROTIME, INR, APTT    Intake & Output:  @24HRIO@    Nausea & Vomiting:  No    Level of Consciousness:  Awake    Pain Assessment:  Adequate analgesia    Anesthesia Complications:  No apparent anesthetic complications    SUMMARY      Vital signs stable  OK to discharge from Stage I post anesthesia care.   Care transferred from Anesthesiology department on discharge from perioperative area

## 2021-09-20 NOTE — PROGRESS NOTES
Preoperative Screening for Elective Surgery/Invasive Procedures While COVID-19 present in the community     Have you had any of the following symptoms? No  o Fever, chills  o Cough  o Shortness of breath  o Muscle aches/pain  o Diarrhea  o Abdominal pain, nausea, vomiting  o Loss or decrease in taste and / or smell   Risk of Exposure  o Have you recently been hospitalized for COVID-19 or flu-like illness, if so when? No  o Recently diagnosed with COVID-19, if so when? No  o Recently tested for COVID-19, if so when? No  o Have you been in close contact with a person or family member who currently has or recently had COVID-23? If yes, when and in what context? No  o Do you live with anybody who in the last 14 days has had fever, chills, shortness of breath, muscle aches, flu-like illness? No  o Do you have any close contacts or family members who are currently in the hospital for COVID-19 or flu-like illness? No  If yes, assess recent close contact with this person. Indicate if the patient has a positive screen by answering yes to one or more of the above questions. Patients who test positive or screen positive prior to surgery or on the day of surgery should be evaluated in conjunction with the surgeon/proceduralist/anesthesiologist to determine the urgency of the procedure.       Pt is unvaccinated

## 2021-09-20 NOTE — ANESTHESIA PRE PROCEDURE
Department of Anesthesiology  Preprocedure Note       Name:  Devyn Guardado   Age:  63 y.o.  :  1960                                          MRN:  [id number]         Date:  2021      Surgeon: Tracy Roman):  Erick Perez MD    Procedure: Procedure(s):  INCISIONAL HERNIA REPAIR WITH MESH, EXCISION OF LIPOMA ON TORSO TIMES TWO  CPT CODE - 27315 X2    Medications prior to admission:   Prior to Admission medications    Medication Sig Start Date End Date Taking? Authorizing Provider   traZODone (DESYREL) 50 MG tablet Take 1 tablet by mouth nightly as needed for Sleep 21   Torie Lopez, APRN - CNP       Current medications:    Current Facility-Administered Medications   Medication Dose Route Frequency Provider Last Rate Last Admin    lactated ringers infusion   IntraVENous Continuous Annita Durant MD 50 mL/hr at 21 1002 New Bag at 21 1002    lidocaine 1 % (PF) injection 0.1 mL  0.1 mL IntraDERmal Once PRN Annita Durant MD           Allergies:  No Known Allergies    Problem List:    Patient Active Problem List   Diagnosis Code    KOURTNEY (stress urinary incontinence, female) N39.3    History of uterine cancer Z85.42    Night sweats R61       Past Medical History:        Diagnosis Date    Menopausal symptoms     Uterine cancer ([facility] [address] 2011    at 50 stage 1       Past Surgical History:        Procedure Laterality Date    COLONOSCOPY      COLONOSCOPY  3/9/16    poor prep;no polyps    HYSTERECTOMY, TOTAL ABDOMINAL  2011    LIPOMA RESECTION      abd.    TUBAL LIGATION         Social History:    Social History     Tobacco Use    Smoking status: Never Smoker    Smokeless tobacco: Never Used   Substance Use Topics    Alcohol use:  Yes     Alcohol/week: 9.0 standard drinks     Types: 9 Glasses of wine per week                                Counseling given: Not Answered      Vital Signs (Current):   Vitals:    21 0810 21 0945   BP:  111/79 Pulse:  65   Resp:  20   Temp:  98.2 °F (36.8 °C)   TempSrc:  Temporal   SpO2:  96%   Weight: 169 lb (76.7 kg) 169 lb (76.7 kg)   Height: 5' 10\" (1.778 m) 5' 10\" (1.778 m)                                              BP Readings from Last 3 Encounters:   09/20/21 111/79   06/21/21 112/72   06/06/21 115/76       NPO Status:                                                                                 BMI:   Wt Readings from Last 3 Encounters:   09/20/21 169 lb (76.7 kg)   06/21/21 170 lb 3.2 oz (77.2 kg)   04/12/21 173 lb (78.5 kg)     Body mass index is 24.25 kg/m². CBC:   Lab Results   Component Value Date    WBC 4.4 04/16/2021    RBC 4.23 04/16/2021    HGB 12.5 04/16/2021    HCT 38.0 04/16/2021    MCV 89.7 04/16/2021    RDW 14.0 04/16/2021     04/16/2021       CMP:   Lab Results   Component Value Date     04/16/2021    K 4.5 04/16/2021    CL 98 04/16/2021    CO2 26 04/16/2021    BUN 13 04/16/2021    CREATININE 0.7 04/16/2021    GFRAA >60 04/16/2021    AGRATIO 1.7 04/16/2021    LABGLOM >60 04/16/2021    GLUCOSE 95 04/16/2021    PROT 7.2 04/16/2021    CALCIUM 9.4 04/16/2021    BILITOT 0.5 04/16/2021    ALKPHOS 82 04/16/2021    AST 18 04/16/2021    ALT 15 04/16/2021       POC Tests: No results for input(s): POCGLU, POCNA, POCK, POCCL, POCBUN, POCHEMO, POCHCT in the last 72 hours.     Coags: No results found for: PROTIME, INR, APTT    HCG (If Applicable):   Lab Results   Component Value Date    PREGTESTUR Neg 07/13/2011        ABGs: No results found for: PHART, PO2ART, LIR2JEW, EST5NVH, BEART, H7UJFJLX     Type & Screen (If Applicable):  No results found for: LABABO, LABRH    Drug/Infectious Status (If Applicable):  No results found for: HIV, HEPCAB    COVID-19 Screening (If Applicable):   Lab Results   Component Value Date    COVID19 Not Detected 09/16/2021           Anesthesia Evaluation  Patient summary reviewed no history of anesthetic complications:   Airway: Mallampati: II  TM distance: >3 FB Neck ROM: full  Mouth opening: > = 3 FB Dental: normal exam         Pulmonary:Negative Pulmonary ROS and normal exam  breath sounds clear to auscultation                             Cardiovascular:Negative CV ROS  Exercise tolerance: good (>4 METS),           Rhythm: regular  Rate: normal                    Neuro/Psych:   Negative Neuro/Psych ROS              GI/Hepatic/Renal: Neg GI/Hepatic/Renal ROS            Endo/Other: Negative Endo/Other ROS                    Abdominal:             Vascular: negative vascular ROS. Other Findings:          Pre-Operative Diagnosis: Lipoma of torso [D17.1]; Incisional hernia, without obstruction or gangrene [K43.2]    63 y.o.   BMI:  Body mass index is 24.25 kg/m². Vitals:    09/17/21 0810 09/20/21 0945   BP:  111/79   Pulse:  65   Resp:  20   Temp:  98.2 °F (36.8 °C)   TempSrc:  Temporal   SpO2:  96%   Weight: 169 lb (76.7 kg) 169 lb (76.7 kg)   Height: 5' 10\" (1.778 m) 5' 10\" (1.778 m)       No Known Allergies    Social History     Tobacco Use    Smoking status: Never Smoker    Smokeless tobacco: Never Used   Substance Use Topics    Alcohol use: Yes     Alcohol/week: 9.0 standard drinks     Types: 9 Glasses of wine per week       LABS:    CBC  Lab Results   Component Value Date/Time    WBC 4.4 04/16/2021 11:31 AM    HGB 12.5 04/16/2021 11:31 AM    HCT 38.0 04/16/2021 11:31 AM     04/16/2021 11:31 AM     RENAL  Lab Results   Component Value Date/Time     (L) 04/16/2021 11:31 AM    K 4.5 04/16/2021 11:31 AM    CL 98 (L) 04/16/2021 11:31 AM    CO2 26 04/16/2021 11:31 AM    BUN 13 04/16/2021 11:31 AM    CREATININE 0.7 04/16/2021 11:31 AM    GLUCOSE 95 04/16/2021 11:31 AM     COAGS  No results found for: PROTIME, INR, APTT          Anesthesia Plan      MAC     ASA 1     (I discussed with the patient the risks and benefits of PIV, anesthesia, IV Narcotics, PACU.   All questions were answered the patient agrees with the plan and wishes to proceed)  Induction: intravenous.                           Hal Yarbrough MD   9/20/2021

## 2021-09-20 NOTE — PROGRESS NOTES
Discharge instructions reviewed with patient and responsible adult. Discharge instructions signed and copy given with no additional questions. Patient to be discharged home with belongings. Discharged in no distress accompanied to passenger side of car with family or significant other driving car. Assessment unchanged. Patient states pain tolerable.

## 2021-09-21 NOTE — OP NOTE
[facility]                 KylerNorah sampson                                 OPERATIVE REPORT    PATIENT NAME: Jorge Vivas                  :        1960  MED REC NO:   [id number]                          ROOM:  ACCOUNT NO:   [de-identified]                           ADMIT DATE: 2021  PROVIDER:     Elby Schlatter, MD    DATE OF PROCEDURE:  2021    PREOPERATIVE DIAGNOSES:  Right posterior shoulder lipoma and  incarcerated incisional hernia. POSTOPERATIVE DIAGNOSES:  Right subfascial shoulder lipoma and  incarcerated incisional hernia. PROCEDURE:  Excision of the right posterior shoulder subfascial lipoma  and incarcerated incisional hernia repair. ANESTHESIA:  Local with MAC. SURGEON:  Elby Schlatter, MD    ESTIMATED BLOOD LOSS:  Minimal.    INDICATIONS:  The patient is a 82-year-old woman who presented with a  lump on her right posterior shoulder. She also lump on her abdomen that  she thought was lipoma, but CT scanning showed to be an incarcerated  incisional hernia. OPERATIVE SUMMARY:  After preoperative evaluation, the patient was  brought into the operating suite and placed in a comfortable left  lateral decubitus position on the stretcher. Her right posterior  shoulder was sterilely prepped and draped and anesthetized with local  anesthetic. An incision was made over the lipoma and dissected down  through the subcutaneous tissues until the fascia was reached. The  fascia was incised and the muscle was split, revealing the lipoma. It  was dissected free of the surrounding structures with blunt dissection  and delivered through the incision. A few remaining adhesions were  divided with electrocautery. The lipoma measured 7.5 cm in length. Bleeding in the wound was controlled with electrocautery.   The incision  was closed with interrupted subcutaneous sutures of 3-0 Vicryl and  running subcuticular suture of 4-0 Vicryl. Benzoin, Steri-Strips, and  dry sterile dressings were applied. She was then flipped into a supine  position and her abdomen was sterilely prepped and draped. The upper  midline was anesthetized with local anesthetic. An incision was made  over this area and dissected down to the fascia. The hernia was  identified and there was a prominent incarcerated herniated bulge which  was anesthetized and amputated at its base with electrocautery. The  defect was small, less than a centimeter in diameter. It was repaired  with a simple suture of #1 Prolene across the middle. The incision was  closed with interrupted subcutaneous sutures of 3-0 Vicryl and running  subcuticular suture of 4-0 Vicryl. Benzoin, Steri-Strips, and dry  sterile dressings were applied. All sponge, needle, and instrument  counts were correct at the end of case. The patient tolerated the  procedure well and was taken to recovery area in stable condition.           Carmel Campuzano MD    D: 09/20/2021 14:17:46       T: 09/20/2021 14:21:49     [email or web address]  Job#: [id number]     Doc#: [id number]    CC:  Gordon Alejandre NP

## 2021-09-22 ENCOUNTER — TELEPHONE (OUTPATIENT)
Dept: FAMILY MEDICINE CLINIC | Age: 61
End: 2021-09-22

## 2021-10-05 ENCOUNTER — OFFICE VISIT (OUTPATIENT)
Dept: SURGERY | Age: 61
End: 2021-10-05

## 2021-10-05 VITALS
HEIGHT: 70 IN | BODY MASS INDEX: 25.2 KG/M2 | DIASTOLIC BLOOD PRESSURE: 76 MMHG | WEIGHT: 176 LBS | SYSTOLIC BLOOD PRESSURE: 122 MMHG

## 2021-10-05 DIAGNOSIS — Z09 POSTOP CHECK: Primary | ICD-10-CM

## 2021-10-05 PROCEDURE — 99024 POSTOP FOLLOW-UP VISIT: CPT | Performed by: SURGERY

## 2021-10-05 NOTE — PROGRESS NOTES
[facility] GENERAL SURGERY      S:   Patient presents s/p excision of lipoma and hernia repair. She reports doing well. O:   Comfortable         Incision sites healing well. A:   S/P excision of lipoma and hernia repair    P:   Follow up as needed.

## 2021-10-19 DIAGNOSIS — G47.00 INSOMNIA, UNSPECIFIED TYPE: ICD-10-CM

## 2021-10-19 RX ORDER — TRAZODONE HYDROCHLORIDE 50 MG/1
50 TABLET ORAL NIGHTLY PRN
Qty: 30 TABLET | Refills: 0 | Status: SHIPPED | OUTPATIENT
Start: 2021-10-19

## 2021-10-19 NOTE — TELEPHONE ENCOUNTER
Refill Request     Last Seen: Last Seen Department: 6/21/2021  Last Seen by PCP: 4/1/2021    Last Written: 4/1/21    Next Appointment:   Future Appointments   Date Time Provider Sintia Dao   4/7/2022  1:00 PM Timbo Mohan, WILLIAM - [facility] [place]       Future appointment scheduled      Requested Prescriptions     Pending Prescriptions Disp Refills    traZODone (DESYREL) 50 MG tablet 30 tablet 0     Sig: Take 1 tablet by mouth nightly as needed for Sleep

## 2022-04-05 ENCOUNTER — TELEPHONE (OUTPATIENT)
Dept: FAMILY MEDICINE CLINIC | Age: 62
End: 2022-04-05

## 2022-04-05 ENCOUNTER — OFFICE VISIT (OUTPATIENT)
Dept: FAMILY MEDICINE CLINIC | Age: 62
End: 2022-04-05
Payer: COMMERCIAL

## 2022-04-05 VITALS
WEIGHT: 168.8 LBS | DIASTOLIC BLOOD PRESSURE: 80 MMHG | SYSTOLIC BLOOD PRESSURE: 106 MMHG | BODY MASS INDEX: 24.22 KG/M2 | OXYGEN SATURATION: 98 % | HEART RATE: 75 BPM

## 2022-04-05 DIAGNOSIS — Z12.11 COLON CANCER SCREENING: ICD-10-CM

## 2022-04-05 DIAGNOSIS — Z00.00 HEALTHCARE MAINTENANCE: Primary | ICD-10-CM

## 2022-04-05 DIAGNOSIS — E78.2 MIXED HYPERLIPIDEMIA: ICD-10-CM

## 2022-04-05 DIAGNOSIS — L65.9 HAIR LOSS: ICD-10-CM

## 2022-04-05 DIAGNOSIS — Z12.31 ENCOUNTER FOR SCREENING MAMMOGRAM FOR MALIGNANT NEOPLASM OF BREAST: ICD-10-CM

## 2022-04-05 LAB
BASOPHILS ABSOLUTE: 0 K/UL (ref 0–0.2)
BASOPHILS RELATIVE PERCENT: 0.5 %
EOSINOPHILS ABSOLUTE: 0.1 K/UL (ref 0–0.6)
EOSINOPHILS RELATIVE PERCENT: 1.2 %
HCT VFR BLD CALC: 39.9 % (ref 36–48)
HEMOGLOBIN: 13.5 G/DL (ref 12–16)
LYMPHOCYTES ABSOLUTE: 2.4 K/UL (ref 1–5.1)
LYMPHOCYTES RELATIVE PERCENT: 48.1 %
MCH RBC QN AUTO: 30.1 PG (ref 26–34)
MCHC RBC AUTO-ENTMCNC: 33.8 G/DL (ref 31–36)
MCV RBC AUTO: 89.1 FL (ref 80–100)
MONOCYTES ABSOLUTE: 0.4 K/UL (ref 0–1.3)
MONOCYTES RELATIVE PERCENT: 7.7 %
NEUTROPHILS ABSOLUTE: 2.1 K/UL (ref 1.7–7.7)
NEUTROPHILS RELATIVE PERCENT: 42.5 %
PDW BLD-RTO: 14.5 % (ref 12.4–15.4)
PLATELET # BLD: 253 K/UL (ref 135–450)
PMV BLD AUTO: 9.9 FL (ref 5–10.5)
RBC # BLD: 4.48 M/UL (ref 4–5.2)
WBC # BLD: 5 K/UL (ref 4–11)

## 2022-04-05 PROCEDURE — 99396 PREV VISIT EST AGE 40-64: CPT | Performed by: NURSE PRACTITIONER

## 2022-04-05 SDOH — ECONOMIC STABILITY: TRANSPORTATION INSECURITY
IN THE PAST 12 MONTHS, HAS LACK OF TRANSPORTATION KEPT YOU FROM MEETINGS, WORK, OR FROM GETTING THINGS NEEDED FOR DAILY LIVING?: NO

## 2022-04-05 SDOH — ECONOMIC STABILITY: INCOME INSECURITY: IN THE LAST 12 MONTHS, WAS THERE A TIME WHEN YOU WERE NOT ABLE TO PAY THE MORTGAGE OR RENT ON TIME?: NO

## 2022-04-05 SDOH — ECONOMIC STABILITY: FOOD INSECURITY: WITHIN THE PAST 12 MONTHS, THE FOOD YOU BOUGHT JUST DIDN'T LAST AND YOU DIDN'T HAVE MONEY TO GET MORE.: NEVER TRUE

## 2022-04-05 SDOH — ECONOMIC STABILITY: HOUSING INSECURITY
IN THE LAST 12 MONTHS, WAS THERE A TIME WHEN YOU DID NOT HAVE A STEADY PLACE TO SLEEP OR SLEPT IN A SHELTER (INCLUDING NOW)?: NO

## 2022-04-05 SDOH — ECONOMIC STABILITY: FOOD INSECURITY: WITHIN THE PAST 12 MONTHS, YOU WORRIED THAT YOUR FOOD WOULD RUN OUT BEFORE YOU GOT MONEY TO BUY MORE.: NEVER TRUE

## 2022-04-05 SDOH — ECONOMIC STABILITY: HOUSING INSECURITY: IN THE LAST 12 MONTHS, HOW MANY PLACES HAVE YOU LIVED?: 1

## 2022-04-05 SDOH — ECONOMIC STABILITY: TRANSPORTATION INSECURITY
IN THE PAST 12 MONTHS, HAS THE LACK OF TRANSPORTATION KEPT YOU FROM MEDICAL APPOINTMENTS OR FROM GETTING MEDICATIONS?: NO

## 2022-04-05 ASSESSMENT — SOCIAL DETERMINANTS OF HEALTH (SDOH): HOW HARD IS IT FOR YOU TO PAY FOR THE VERY BASICS LIKE FOOD, HOUSING, MEDICAL CARE, AND HEATING?: NOT HARD AT ALL

## 2022-04-05 ASSESSMENT — ENCOUNTER SYMPTOMS
GASTROINTESTINAL NEGATIVE: 1
RESPIRATORY NEGATIVE: 1
EYES NEGATIVE: 1

## 2022-04-05 ASSESSMENT — PATIENT HEALTH QUESTIONNAIRE - PHQ9
2. FEELING DOWN, DEPRESSED OR HOPELESS: 0
SUM OF ALL RESPONSES TO PHQ QUESTIONS 1-9: 0
1. LITTLE INTEREST OR PLEASURE IN DOING THINGS: 0
SUM OF ALL RESPONSES TO PHQ9 QUESTIONS 1 & 2: 0

## 2022-04-05 NOTE — PROGRESS NOTES
2022    Trevor Nolan (:  1960) is a 63 y.o. female, here for a preventive medicine evaluation. Pt is here today for her annual physical.  She is concerned about hair \"shedding\" for the past 2 months. Denies unexplained weight gain, weight loss, intolerance to heat or cold. H/o uterine cancer, s/p total hysterectomy. Sees Dr. Cuco Watt with [facility].     Denies family h/o breast or colon CA. Due for mammogram 2021. Pt had a colonoscopy in 2016 - Dr. Gennaro Spencer ([address] [place] [address]). Pt states that she was told that her colonoscopy was normal, but he wanted her to f/u b/c she did not \"get cleaned out completely\". Pt would like to do Cologuard instead.       UTD on routine dental and vision exams. Last fasting labs done 2021. TC and LDL were elevated. Pt is fasting today. Takes Trazodone as needed for insomnia. Patient Active Problem List   Diagnosis    KOURTNEY (stress urinary incontinence, female)    History of uterine cancer    Night sweats    Lipoma of torso       Review of Systems   Constitutional: Negative. HENT: Negative. Eyes: Negative. Respiratory: Negative. Cardiovascular: Negative. Gastrointestinal: Negative. Endocrine:        \"Hair shedding\". Genitourinary: Negative. Musculoskeletal: Negative. Skin: Negative. Neurological: Negative. Psychiatric/Behavioral: Negative. Prior to Visit Medications    Medication Sig Taking?  Authorizing Provider   traZODone (DESYREL) 50 MG tablet Take 1 tablet by mouth nightly as needed for Sleep Yes WILLIAM Santana - CNP        No Known Allergies    Past Medical History:   Diagnosis Date    Menopausal symptoms     Uterine cancer ([facility] [address] 2011    at 50 stage 1       Past Surgical History:   Procedure Laterality Date    COLONOSCOPY      COLONOSCOPY  3/9/16    poor prep;no polyps    HYSTERECTOMY, TOTAL ABDOMINAL  2011    LIPOMA RESECTION      abd.    TUBAL LIGATION      VENTRAL HERNIA REPAIR N/A 9/20/2021    INCISIONAL HERNIA REPAIR WITH MESH, EXCISION OF LIPOMA ON TORSO TIMES TWO  CPT CODE - 06791 X2 performed by Matty Tran MD at [facility] Marital status:      Spouse name: Not on file    Number of children: Not on file    Years of education: Not on file    Highest education level: Not on file   Occupational History    Not on file   Tobacco Use    Smoking status: Never Smoker    Smokeless tobacco: Never Used   Substance and Sexual Activity    Alcohol use: Yes     Alcohol/week: 9.0 standard drinks     Types: 9 Glasses of wine per week    Drug use: Never    Sexual activity: Yes   Other Topics Concern    Not on file   Social History Narrative    Not on file     Social Determinants of Health     Financial Resource Strain: Low Risk     Difficulty of Paying Living Expenses: Not hard at all   Food Insecurity: No Food Insecurity    Worried About Running Out of Food in the Last Year: Never true    [address] in the Last Year: Never true   Transportation Needs: No Transportation Needs    Lack of Transportation (Medical): No    Lack of Transportation (Non-Medical):  No   Physical Activity:     Days of Exercise per Week: Not on file    Minutes of Exercise per Session: Not on file   Stress:     Feeling of Stress : Not on file   Social Connections:     Frequency of Communication with Friends and Family: Not on file    Frequency of Social Gatherings with Friends and Family: Not on file    Attends Hinduism Services: Not on file    Active Member of Clubs or Organizations: Not on file    Attends Club or Organization Meetings: Not on file    Marital Status: Not on file   Intimate Partner Violence:     Fear of Current or Ex-Partner: Not on file    Emotionally Abused: Not on file    Physically Abused: Not on file    Sexually Abused: Not on file   Housing Stability: [address] Unable to Pay for Housing in the Last Year: No    Number of Places Lived in the Last Year: 1    Unstable Housing in the Last Year: No        Family History   Problem Relation Age of Onset    High Blood Pressure Mother     Cancer Father         leukemia at 39    Cancer Brother         bladder cancer at 62       ADVANCE DIRECTIVE: N, <no information>    Vitals:    04/05/22 1333   BP: 106/80   Site: Left Upper Arm   Position: Sitting   Cuff Size: Medium Adult   Pulse: 75   SpO2: 98%   Weight: 168 lb 12.8 oz (76.6 kg)     Estimated body mass index is 24.22 kg/m² as calculated from the following:    Height as of 10/5/21: 5' 10\" (1.778 m). Weight as of this encounter: 168 lb 12.8 oz (76.6 kg). Physical Exam  Vitals reviewed. Constitutional:       Appearance: Normal appearance. She is well-developed. She is not ill-appearing. HENT:      Head: Normocephalic. Eyes:      General: Lids are normal.         Right eye: No discharge. Left eye: No discharge. Neck:      Thyroid: No thyroid mass, thyromegaly or thyroid tenderness. Vascular: Normal carotid pulses. No carotid bruit or JVD. Cardiovascular:      Rate and Rhythm: Normal rate and regular rhythm. Pulses: Normal pulses. Heart sounds: Normal heart sounds. No murmur heard. Pulmonary:      Effort: Pulmonary effort is normal.      Breath sounds: Normal breath sounds. Musculoskeletal:         General: Normal range of motion. Cervical back: Full passive range of motion without pain and normal range of motion. No edema or crepitus. No pain with movement. Normal range of motion. Lymphadenopathy:      Cervical: No cervical adenopathy. Right cervical: No superficial cervical adenopathy. Left cervical: No superficial cervical adenopathy. Skin:     General: Skin is warm and dry. Capillary Refill: Capillary refill takes less than 2 seconds. Neurological:      General: No focal deficit present.       Mental Status: She is alert and oriented to person, place, and time. Psychiatric:         Mood and Affect: Mood normal.         Behavior: Behavior normal. Behavior is cooperative. Thought Content: Thought content normal.         Judgment: Judgment normal.         No flowsheet data found. Lab Results   Component Value Date    CHOL 237 04/16/2021    CHOL 224 03/09/2020    TRIG 69 04/16/2021    TRIG 98 03/09/2020    HDL 80 04/16/2021    HDL 69 03/09/2020    LDLCALC 143 04/16/2021    LDLCALC 135 03/09/2020    GLUCOSE 95 04/16/2021       The 10-year ASCVD risk score (Prisca Michael et al., 2013) is: 2%    Values used to calculate the score:      Age: 63 years      Sex: Female      Is Non- : No      Diabetic: No      Tobacco smoker: No      Systolic Blood Pressure: 175 mmHg      Is BP treated: No      HDL Cholesterol: 80 mg/dL      Total Cholesterol: 237 mg/dL    Immunization History   Administered Date(s) Administered    Influenza, MDCK Quadv, with preserv IM (Flucelvax 2 yrs and older) 01/27/2019    Tdap (Boostrix, Adacel) 04/18/2013, 01/07/2019       Health Maintenance   Topic Date Due    COVID-19 Vaccine (1) Never done    Colorectal Cancer Screen  Never done    Shingles Vaccine (1 of 2) Never done    Depression Screen  04/01/2022    Breast cancer screen  05/29/2022    Flu vaccine (Season Ended) 09/01/2022    Lipid screen  04/16/2026    DTaP/Tdap/Td vaccine (3 - Td or Tdap) 01/07/2029    Hepatitis C screen  Completed    Hepatitis A vaccine  Aged Out    Hepatitis B vaccine  Aged Out    Hib vaccine  Aged Out    Meningococcal (ACWY) vaccine  Aged Out    Pneumococcal 0-64 years Vaccine  Aged Out    HIV screen  Discontinued       Assessment & Plan      Healthcare maintenance  Encouraged healthy diet and exercise. UTD on routine dental and vision exams. Will check fasting labs today.       -     CBC with Auto Differential  -     Comprehensive Metabolic Panel w/ Reflex to MG  -     Vitamin D 25 Hydroxy  -     Lipid Panel  -     T4, Free  -     TSH  -     Hemoglobin A1C    Encounter for screening mammogram for malignant neoplasm of breast  -     ANIL DIGITAL SCREEN W OR WO CAD BILATERAL; Future    Colon cancer screening  -     Fecal DNA Colorectal cancer screening (Cologuard)    Hair loss  -     Vitamin D 25 Hydroxy  -     T4, Free  -     TSH    Mixed hyperlipidemia  -     Lipid Panel    Return in about 1 year (around 4/5/2023).          --WILLIAM Woodall - CNP

## 2022-04-05 NOTE — TELEPHONE ENCOUNTER
[facility]/ Dr Tita Campbell office, and requested Colonoscopy results to be faxed to our office @ [phone].

## 2022-04-06 LAB
A/G RATIO: 1.8 (ref 1.1–2.2)
ALBUMIN SERPL-MCNC: 5.1 G/DL (ref 3.4–5)
ALP BLD-CCNC: 91 U/L (ref 40–129)
ALT SERPL-CCNC: 19 U/L (ref 10–40)
ANION GAP SERPL CALCULATED.3IONS-SCNC: 13 MMOL/L (ref 3–16)
AST SERPL-CCNC: 22 U/L (ref 15–37)
BILIRUB SERPL-MCNC: 0.5 MG/DL (ref 0–1)
BUN BLDV-MCNC: 16 MG/DL (ref 7–20)
CALCIUM SERPL-MCNC: 10 MG/DL (ref 8.3–10.6)
CHLORIDE BLD-SCNC: 96 MMOL/L (ref 99–110)
CHOLESTEROL, TOTAL: 280 MG/DL (ref 0–199)
CO2: 29 MMOL/L (ref 21–32)
CREAT SERPL-MCNC: 0.8 MG/DL (ref 0.6–1.2)
ESTIMATED AVERAGE GLUCOSE: 111.2 MG/DL
GFR AFRICAN AMERICAN: >60
GFR NON-AFRICAN AMERICAN: >60
GLUCOSE BLD-MCNC: 104 MG/DL (ref 70–99)
HBA1C MFR BLD: 5.5 %
HDLC SERPL-MCNC: 86 MG/DL (ref 40–60)
LDL CHOLESTEROL CALCULATED: 170 MG/DL
POTASSIUM REFLEX MAGNESIUM: 4.2 MMOL/L (ref 3.5–5.1)
SODIUM BLD-SCNC: 138 MMOL/L (ref 136–145)
T4 FREE: 1.3 NG/DL (ref 0.9–1.8)
TOTAL PROTEIN: 8 G/DL (ref 6.4–8.2)
TRIGL SERPL-MCNC: 122 MG/DL (ref 0–150)
TSH SERPL DL<=0.05 MIU/L-ACNC: 2.84 UIU/ML (ref 0.27–4.2)
VITAMIN D 25-HYDROXY: 38 NG/ML
VLDLC SERPL CALC-MCNC: 24 MG/DL

## 2022-04-07 ENCOUNTER — HOSPITAL ENCOUNTER (OUTPATIENT)
Dept: WOMENS IMAGING | Age: 62
Discharge: HOME | End: 2022-04-07
Payer: COMMERCIAL

## 2022-04-07 DIAGNOSIS — Z12.31 ENCOUNTER FOR SCREENING MAMMOGRAM FOR BREAST CANCER: ICD-10-CM

## 2022-04-07 PROCEDURE — 77063 BREAST TOMOSYNTHESIS BI: CPT

## 2022-05-10 LAB — NONINV COLON CA DNA+OCC BLD SCRN STL QL: NEGATIVE

## 2023-11-09 ENCOUNTER — HOSPITAL ENCOUNTER (OUTPATIENT)
Dept: WOMENS IMAGING | Age: 63
Discharge: HOME | End: 2023-11-09
Payer: COMMERCIAL

## 2023-11-09 DIAGNOSIS — Z12.31 ENCOUNTER FOR SCREENING MAMMOGRAM FOR BREAST CANCER: ICD-10-CM

## 2023-11-09 PROCEDURE — 77063 BREAST TOMOSYNTHESIS BI: CPT

## 2023-11-10 ENCOUNTER — TELEPHONE (OUTPATIENT)
Dept: WOMENS IMAGING | Age: 63
End: 2023-11-10

## 2023-11-10 NOTE — TELEPHONE ENCOUNTER
Reviewed screening mammogram results with patient. Patient verbalized understanding. Patient has been scheduled for additional imaging.        VANCE Weston, CN-BM  [facility]  [phone]

## 2023-11-22 ENCOUNTER — HOSPITAL ENCOUNTER (OUTPATIENT)
Dept: WOMENS IMAGING | Age: 63
Discharge: HOME | End: 2023-11-22
Payer: COMMERCIAL

## 2023-11-22 DIAGNOSIS — R92.8 ABNORMAL MAMMOGRAM: ICD-10-CM

## 2023-11-22 DIAGNOSIS — R92.8 ABNORMAL MAMMOGRAM OF BOTH BREASTS: ICD-10-CM

## 2023-11-22 PROCEDURE — G0279 TOMOSYNTHESIS, MAMMO: HCPCS

## 2023-11-22 PROCEDURE — 76642 ULTRASOUND BREAST LIMITED: CPT

## (undated) DEVICE — GOWN,SIRUS,NON REINFRCD,LARGE,SET IN SL: Brand: MEDLINE

## (undated) DEVICE — SET ADMIN PRIMING 7ML L30IN 7.35LB 20 GTT 2ND RLER CLMP

## (undated) DEVICE — GAUZE,SPONGE,2"X2",8PLY,STERILE,LF,2'S: Brand: MEDLINE

## (undated) DEVICE — GOWN,SIRUS,POLYRNF,SETINSLV,L,20/CS: Brand: MEDLINE

## (undated) DEVICE — GLOVE SURG SZ 75 L12IN FNGR THK94MIL STD WHT LTX FREE

## (undated) DEVICE — SUTURE PROL SZ 1 L30IN NONABSORBABLE BLU L36MM CT-1 1/2 CIR 8425H

## (undated) DEVICE — 3M™ STERI-STRIP™ REINFORCED ADHESIVE SKIN CLOSURES, R1547, 1/2 IN X 4 IN (12 MM X 100 MM), 6 STRIPS/ENVELOPE: Brand: 3M™ STERI-STRIP™

## (undated) DEVICE — GAUZE,SPONGE,4"X4",16PLY,STRL,LF,10/TRAY: Brand: MEDLINE

## (undated) DEVICE — GLOVE SURG SZ 65 L12IN FNGR THK79MIL GRN LTX FREE

## (undated) DEVICE — 3M™ STERI-STRIP™ COMPOUND BENZOIN TINCTURE 40 BAGS/CARTON 4 CARTONS/CASE C1544: Brand: 3M™ STERI-STRIP™

## (undated) DEVICE — SUTURE VCRL SZ 0 L27IN ABSRB UD L26MM CT-2 1/2 CIR J270H

## (undated) DEVICE — MINOR SET UP PK

## (undated) DEVICE — SOLUTION IV 1000ML LAC RINGERS PH 6.5 INJ USP VIAFLX PLAS

## (undated) DEVICE — GAUZE,SPONGE,4"X4",16PLY,XRAY,STRL,LF: Brand: MEDLINE

## (undated) DEVICE — GLOVE SURG SZ 65 L12IN FNGR THK94MIL STD WHT LTX FREE

## (undated) DEVICE — CHLORAPREP 26ML ORANGE

## (undated) DEVICE — SET GRAV VENT NVENT CK VLV 3 NDL FREE PRT 10 GTT

## (undated) DEVICE — SUTURE VCRL SZ 3-0 L18IN ABSRB UD L26MM SH 1/2 CIR J864D

## (undated) DEVICE — CATHETER IV 20GA L1.25IN PNK FEP SFTY STR HUB RADPQ DISP

## (undated) DEVICE — ELECTRODE PT RET AD L9FT HI MOIST COND ADH HYDRGEL CORDED

## (undated) DEVICE — SUTURE VCRL SZ 4-0 L18IN ABSRB UD L19MM PS-2 3/8 CIR PRIM J496H

## (undated) DEVICE — 3M™ TEGADERM™ TRANSPARENT FILM DRESSING FRAME STYLE, 1624W, 2-3/8 IN X 2-3/4 IN (6 CM X 7 CM), 100/CT 4CT/CASE: Brand: 3M™ TEGADERM™

## (undated) DEVICE — PENCIL ES L3M BTTN SWCH S STL HEX LOK BLDE ELECTRD HOLSTER

## (undated) DEVICE — MEDI-VAC NON-CONDUCTIVE SUCTION TUBING: Brand: CARDINAL HEALTH

## (undated) DEVICE — SURGICAL PROCEDURE PACK IV U-BAR

## (undated) DEVICE — GLOVE,SURG,SENSICARE,ALOE,LF,PF,7: Brand: MEDLINE

## (undated) DEVICE — 3M™ TEGADERM™ TRANSPARENT FILM DRESSING FRAME STYLE, 1626W, 4 IN X 4-3/4 IN (10 CM X 12 CM), 50/CT 4CT/CASE: Brand: 3M™ TEGADERM™